# Patient Record
Sex: FEMALE | Race: WHITE | NOT HISPANIC OR LATINO | URBAN - METROPOLITAN AREA
[De-identification: names, ages, dates, MRNs, and addresses within clinical notes are randomized per-mention and may not be internally consistent; named-entity substitution may affect disease eponyms.]

---

## 2022-09-09 ENCOUNTER — EMERGENCY (EMERGENCY)
Facility: HOSPITAL | Age: 82
LOS: 1 days | Discharge: ANOTHER HOSPITAL | End: 2022-09-09
Attending: EMERGENCY MEDICINE
Payer: MEDICARE

## 2022-09-09 ENCOUNTER — INPATIENT (INPATIENT)
Facility: HOSPITAL | Age: 82
LOS: 5 days | Discharge: NURSING, REHAB OR LONG-TERM CARE | DRG: 467 | End: 2022-09-15
Attending: ORTHOPAEDIC SURGERY | Admitting: ORTHOPAEDIC SURGERY
Payer: MEDICARE

## 2022-09-09 VITALS
RESPIRATION RATE: 18 BRPM | HEART RATE: 83 BPM | TEMPERATURE: 98 F | SYSTOLIC BLOOD PRESSURE: 144 MMHG | OXYGEN SATURATION: 99 % | DIASTOLIC BLOOD PRESSURE: 73 MMHG

## 2022-09-09 VITALS
HEART RATE: 76 BPM | RESPIRATION RATE: 16 BRPM | WEIGHT: 139.99 LBS | HEIGHT: 60 IN | SYSTOLIC BLOOD PRESSURE: 175 MMHG | TEMPERATURE: 98 F | OXYGEN SATURATION: 99 % | DIASTOLIC BLOOD PRESSURE: 78 MMHG

## 2022-09-09 VITALS
HEART RATE: 114 BPM | HEIGHT: 60 IN | SYSTOLIC BLOOD PRESSURE: 106 MMHG | OXYGEN SATURATION: 96 % | RESPIRATION RATE: 20 BRPM | WEIGHT: 136.03 LBS | DIASTOLIC BLOOD PRESSURE: 76 MMHG | TEMPERATURE: 98 F

## 2022-09-09 DIAGNOSIS — M97.8XXA PERIPROSTHETIC FRACTURE AROUND OTHER INTERNAL PROSTHETIC JOINT, INITIAL ENCOUNTER: ICD-10-CM

## 2022-09-09 LAB
ALBUMIN SERPL ELPH-MCNC: 3.5 G/DL — SIGNIFICANT CHANGE UP (ref 3.5–5)
ALBUMIN SERPL ELPH-MCNC: 3.9 G/DL — SIGNIFICANT CHANGE UP (ref 3.3–5)
ALP SERPL-CCNC: 55 U/L — SIGNIFICANT CHANGE UP (ref 40–120)
ALP SERPL-CCNC: 57 U/L — SIGNIFICANT CHANGE UP (ref 40–120)
ALT FLD-CCNC: 14 U/L DA — SIGNIFICANT CHANGE UP (ref 10–60)
ALT FLD-CCNC: 8 U/L — LOW (ref 10–45)
ANION GAP SERPL CALC-SCNC: 12 MMOL/L — SIGNIFICANT CHANGE UP (ref 5–17)
ANION GAP SERPL CALC-SCNC: 8 MMOL/L — SIGNIFICANT CHANGE UP (ref 5–17)
APTT BLD: 24.5 SEC — LOW (ref 27.5–35.5)
APTT BLD: 25.5 SEC — LOW (ref 27.5–35.5)
AST SERPL-CCNC: 17 U/L — SIGNIFICANT CHANGE UP (ref 10–40)
AST SERPL-CCNC: 24 U/L — SIGNIFICANT CHANGE UP (ref 10–40)
BASE EXCESS BLDV CALC-SCNC: -0.9 MMOL/L — SIGNIFICANT CHANGE UP (ref -2–3)
BASOPHILS # BLD AUTO: 0.01 K/UL — SIGNIFICANT CHANGE UP (ref 0–0.2)
BASOPHILS # BLD AUTO: 0.02 K/UL — SIGNIFICANT CHANGE UP (ref 0–0.2)
BASOPHILS NFR BLD AUTO: 0.1 % — SIGNIFICANT CHANGE UP (ref 0–2)
BASOPHILS NFR BLD AUTO: 0.3 % — SIGNIFICANT CHANGE UP (ref 0–2)
BILIRUB SERPL-MCNC: 0.4 MG/DL — SIGNIFICANT CHANGE UP (ref 0.2–1.2)
BILIRUB SERPL-MCNC: 0.5 MG/DL — SIGNIFICANT CHANGE UP (ref 0.2–1.2)
BLD GP AB SCN SERPL QL: NEGATIVE — SIGNIFICANT CHANGE UP
BUN SERPL-MCNC: 16 MG/DL — SIGNIFICANT CHANGE UP (ref 7–18)
BUN SERPL-MCNC: 23 MG/DL — SIGNIFICANT CHANGE UP (ref 7–23)
CA-I SERPL-SCNC: 1.22 MMOL/L — SIGNIFICANT CHANGE UP (ref 1.15–1.33)
CALCIUM SERPL-MCNC: 9.5 MG/DL — SIGNIFICANT CHANGE UP (ref 8.4–10.5)
CALCIUM SERPL-MCNC: 9.6 MG/DL — SIGNIFICANT CHANGE UP (ref 8.4–10.5)
CHLORIDE BLDV-SCNC: 104 MMOL/L — SIGNIFICANT CHANGE UP (ref 96–108)
CHLORIDE SERPL-SCNC: 103 MMOL/L — SIGNIFICANT CHANGE UP (ref 96–108)
CHLORIDE SERPL-SCNC: 105 MMOL/L — SIGNIFICANT CHANGE UP (ref 96–108)
CO2 BLDV-SCNC: 27 MMOL/L — HIGH (ref 22–26)
CO2 SERPL-SCNC: 23 MMOL/L — SIGNIFICANT CHANGE UP (ref 22–31)
CO2 SERPL-SCNC: 28 MMOL/L — SIGNIFICANT CHANGE UP (ref 22–31)
CREAT SERPL-MCNC: 0.8 MG/DL — SIGNIFICANT CHANGE UP (ref 0.5–1.3)
CREAT SERPL-MCNC: 1 MG/DL — SIGNIFICANT CHANGE UP (ref 0.5–1.3)
EGFR: 57 ML/MIN/1.73M2 — LOW
EGFR: 75 ML/MIN/1.73M2 — SIGNIFICANT CHANGE UP
EOSINOPHIL # BLD AUTO: 0 K/UL — SIGNIFICANT CHANGE UP (ref 0–0.5)
EOSINOPHIL # BLD AUTO: 0.08 K/UL — SIGNIFICANT CHANGE UP (ref 0–0.5)
EOSINOPHIL NFR BLD AUTO: 0 % — SIGNIFICANT CHANGE UP (ref 0–6)
EOSINOPHIL NFR BLD AUTO: 1.1 % — SIGNIFICANT CHANGE UP (ref 0–6)
GAS PNL BLDV: 137 MMOL/L — SIGNIFICANT CHANGE UP (ref 136–145)
GAS PNL BLDV: SIGNIFICANT CHANGE UP
GLUCOSE BLDV-MCNC: 207 MG/DL — HIGH (ref 70–99)
GLUCOSE SERPL-MCNC: 118 MG/DL — HIGH (ref 70–99)
GLUCOSE SERPL-MCNC: 205 MG/DL — HIGH (ref 70–99)
HCO3 BLDV-SCNC: 25 MMOL/L — SIGNIFICANT CHANGE UP (ref 22–29)
HCT VFR BLD CALC: 32 % — LOW (ref 34.5–45)
HCT VFR BLD CALC: 37.4 % — SIGNIFICANT CHANGE UP (ref 34.5–45)
HCT VFR BLDA CALC: 31 % — LOW (ref 34.5–46.5)
HGB BLD CALC-MCNC: 10.2 G/DL — LOW (ref 11.7–16.1)
HGB BLD-MCNC: 10.2 G/DL — LOW (ref 11.5–15.5)
HGB BLD-MCNC: 11.9 G/DL — SIGNIFICANT CHANGE UP (ref 11.5–15.5)
IMM GRANULOCYTES NFR BLD AUTO: 0.7 % — SIGNIFICANT CHANGE UP (ref 0–1.5)
IMM GRANULOCYTES NFR BLD AUTO: 0.8 % — SIGNIFICANT CHANGE UP (ref 0–1.5)
INR BLD: 1 RATIO — SIGNIFICANT CHANGE UP (ref 0.88–1.16)
INR BLD: 1.08 RATIO — SIGNIFICANT CHANGE UP (ref 0.88–1.16)
LACTATE BLDV-MCNC: 2.2 MMOL/L — HIGH (ref 0.5–2)
LYMPHOCYTES # BLD AUTO: 0.54 K/UL — LOW (ref 1–3.3)
LYMPHOCYTES # BLD AUTO: 1.54 K/UL — SIGNIFICANT CHANGE UP (ref 1–3.3)
LYMPHOCYTES # BLD AUTO: 20.4 % — SIGNIFICANT CHANGE UP (ref 13–44)
LYMPHOCYTES # BLD AUTO: 4.5 % — LOW (ref 13–44)
MCHC RBC-ENTMCNC: 30.2 PG — SIGNIFICANT CHANGE UP (ref 27–34)
MCHC RBC-ENTMCNC: 30.2 PG — SIGNIFICANT CHANGE UP (ref 27–34)
MCHC RBC-ENTMCNC: 31.8 GM/DL — LOW (ref 32–36)
MCHC RBC-ENTMCNC: 31.9 GM/DL — LOW (ref 32–36)
MCV RBC AUTO: 94.7 FL — SIGNIFICANT CHANGE UP (ref 80–100)
MCV RBC AUTO: 94.9 FL — SIGNIFICANT CHANGE UP (ref 80–100)
MONOCYTES # BLD AUTO: 0.54 K/UL — SIGNIFICANT CHANGE UP (ref 0–0.9)
MONOCYTES # BLD AUTO: 0.61 K/UL — SIGNIFICANT CHANGE UP (ref 0–0.9)
MONOCYTES NFR BLD AUTO: 5.1 % — SIGNIFICANT CHANGE UP (ref 2–14)
MONOCYTES NFR BLD AUTO: 7.2 % — SIGNIFICANT CHANGE UP (ref 2–14)
NEUTROPHILS # BLD AUTO: 10.72 K/UL — HIGH (ref 1.8–7.4)
NEUTROPHILS # BLD AUTO: 5.32 K/UL — SIGNIFICANT CHANGE UP (ref 1.8–7.4)
NEUTROPHILS NFR BLD AUTO: 70.3 % — SIGNIFICANT CHANGE UP (ref 43–77)
NEUTROPHILS NFR BLD AUTO: 89.5 % — HIGH (ref 43–77)
NRBC # BLD: 0 /100 WBCS — SIGNIFICANT CHANGE UP (ref 0–0)
NRBC # BLD: 0 /100 WBCS — SIGNIFICANT CHANGE UP (ref 0–0)
NT-PROBNP SERPL-SCNC: 795 PG/ML — HIGH (ref 0–300)
PCO2 BLDV: 48 MMHG — HIGH (ref 39–42)
PH BLDV: 7.33 — SIGNIFICANT CHANGE UP (ref 7.32–7.43)
PLATELET # BLD AUTO: 241 K/UL — SIGNIFICANT CHANGE UP (ref 150–400)
PLATELET # BLD AUTO: 250 K/UL — SIGNIFICANT CHANGE UP (ref 150–400)
PO2 BLDV: 40 MMHG — SIGNIFICANT CHANGE UP (ref 25–45)
POTASSIUM BLDV-SCNC: 4.6 MMOL/L — SIGNIFICANT CHANGE UP (ref 3.5–5.1)
POTASSIUM SERPL-MCNC: 4 MMOL/L — SIGNIFICANT CHANGE UP (ref 3.5–5.3)
POTASSIUM SERPL-MCNC: 4.5 MMOL/L — SIGNIFICANT CHANGE UP (ref 3.5–5.3)
POTASSIUM SERPL-SCNC: 4 MMOL/L — SIGNIFICANT CHANGE UP (ref 3.5–5.3)
POTASSIUM SERPL-SCNC: 4.5 MMOL/L — SIGNIFICANT CHANGE UP (ref 3.5–5.3)
PROT SERPL-MCNC: 7.2 G/DL — SIGNIFICANT CHANGE UP (ref 6–8.3)
PROT SERPL-MCNC: 7.7 G/DL — SIGNIFICANT CHANGE UP (ref 6–8.3)
PROTHROM AB SERPL-ACNC: 11.9 SEC — SIGNIFICANT CHANGE UP (ref 10.5–13.4)
PROTHROM AB SERPL-ACNC: 12.5 SEC — SIGNIFICANT CHANGE UP (ref 10.5–13.4)
RBC # BLD: 3.38 M/UL — LOW (ref 3.8–5.2)
RBC # BLD: 3.94 M/UL — SIGNIFICANT CHANGE UP (ref 3.8–5.2)
RBC # FLD: 11.8 % — SIGNIFICANT CHANGE UP (ref 10.3–14.5)
RBC # FLD: 11.9 % — SIGNIFICANT CHANGE UP (ref 10.3–14.5)
RH IG SCN BLD-IMP: POSITIVE — SIGNIFICANT CHANGE UP
SAO2 % BLDV: 70 % — SIGNIFICANT CHANGE UP (ref 67–88)
SARS-COV-2 RNA SPEC QL NAA+PROBE: SIGNIFICANT CHANGE UP
SARS-COV-2 RNA SPEC QL NAA+PROBE: SIGNIFICANT CHANGE UP
SODIUM SERPL-SCNC: 138 MMOL/L — SIGNIFICANT CHANGE UP (ref 135–145)
SODIUM SERPL-SCNC: 141 MMOL/L — SIGNIFICANT CHANGE UP (ref 135–145)
TROPONIN T, HIGH SENSITIVITY RESULT: 31 NG/L — SIGNIFICANT CHANGE UP (ref 0–51)
WBC # BLD: 11.97 K/UL — HIGH (ref 3.8–10.5)
WBC # BLD: 7.55 K/UL — SIGNIFICANT CHANGE UP (ref 3.8–10.5)
WBC # FLD AUTO: 11.97 K/UL — HIGH (ref 3.8–10.5)
WBC # FLD AUTO: 7.55 K/UL — SIGNIFICANT CHANGE UP (ref 3.8–10.5)

## 2022-09-09 PROCEDURE — 73552 X-RAY EXAM OF FEMUR 2/>: CPT | Mod: 26,RT

## 2022-09-09 PROCEDURE — 73551 X-RAY EXAM OF FEMUR 1: CPT | Mod: 26,59,RT

## 2022-09-09 PROCEDURE — 96374 THER/PROPH/DIAG INJ IV PUSH: CPT

## 2022-09-09 PROCEDURE — 99291 CRITICAL CARE FIRST HOUR: CPT | Mod: 25

## 2022-09-09 PROCEDURE — 73502 X-RAY EXAM HIP UNI 2-3 VIEWS: CPT

## 2022-09-09 PROCEDURE — 73502 X-RAY EXAM HIP UNI 2-3 VIEWS: CPT | Mod: 26,RT

## 2022-09-09 PROCEDURE — 73552 X-RAY EXAM OF FEMUR 2/>: CPT

## 2022-09-09 PROCEDURE — 85730 THROMBOPLASTIN TIME PARTIAL: CPT

## 2022-09-09 PROCEDURE — 86850 RBC ANTIBODY SCREEN: CPT

## 2022-09-09 PROCEDURE — 86901 BLOOD TYPING SEROLOGIC RH(D): CPT

## 2022-09-09 PROCEDURE — 71045 X-RAY EXAM CHEST 1 VIEW: CPT

## 2022-09-09 PROCEDURE — 85610 PROTHROMBIN TIME: CPT

## 2022-09-09 PROCEDURE — 71045 X-RAY EXAM CHEST 1 VIEW: CPT | Mod: 26

## 2022-09-09 PROCEDURE — 96375 TX/PRO/DX INJ NEW DRUG ADDON: CPT

## 2022-09-09 PROCEDURE — 93005 ELECTROCARDIOGRAM TRACING: CPT

## 2022-09-09 PROCEDURE — 93010 ELECTROCARDIOGRAM REPORT: CPT

## 2022-09-09 PROCEDURE — 85025 COMPLETE CBC W/AUTO DIFF WBC: CPT

## 2022-09-09 PROCEDURE — 87635 SARS-COV-2 COVID-19 AMP PRB: CPT

## 2022-09-09 PROCEDURE — 80053 COMPREHEN METABOLIC PANEL: CPT

## 2022-09-09 PROCEDURE — 99291 CRITICAL CARE FIRST HOUR: CPT

## 2022-09-09 PROCEDURE — 86900 BLOOD TYPING SEROLOGIC ABO: CPT

## 2022-09-09 PROCEDURE — 99285 EMERGENCY DEPT VISIT HI MDM: CPT | Mod: 25

## 2022-09-09 PROCEDURE — 73502 X-RAY EXAM HIP UNI 2-3 VIEWS: CPT | Mod: 26,RT,77

## 2022-09-09 PROCEDURE — 36415 COLL VENOUS BLD VENIPUNCTURE: CPT

## 2022-09-09 PROCEDURE — 71045 X-RAY EXAM CHEST 1 VIEW: CPT | Mod: 26,77

## 2022-09-09 RX ORDER — ACETAMINOPHEN 500 MG
650 TABLET ORAL EVERY 6 HOURS
Refills: 0 | Status: DISCONTINUED | OUTPATIENT
Start: 2022-09-09 | End: 2022-09-11

## 2022-09-09 RX ORDER — MAGNESIUM HYDROXIDE 400 MG/1
30 TABLET, CHEWABLE ORAL DAILY
Refills: 0 | Status: DISCONTINUED | OUTPATIENT
Start: 2022-09-09 | End: 2022-09-15

## 2022-09-09 RX ORDER — OXYCODONE HYDROCHLORIDE 5 MG/1
2.5 TABLET ORAL EVERY 4 HOURS
Refills: 0 | Status: DISCONTINUED | OUTPATIENT
Start: 2022-09-09 | End: 2022-09-15

## 2022-09-09 RX ORDER — SENNA PLUS 8.6 MG/1
2 TABLET ORAL AT BEDTIME
Refills: 0 | Status: DISCONTINUED | OUTPATIENT
Start: 2022-09-09 | End: 2022-09-15

## 2022-09-09 RX ORDER — ACETAMINOPHEN 500 MG
1000 TABLET ORAL ONCE
Refills: 0 | Status: COMPLETED | OUTPATIENT
Start: 2022-09-09 | End: 2022-09-09

## 2022-09-09 RX ORDER — ENOXAPARIN SODIUM 100 MG/ML
40 INJECTION SUBCUTANEOUS EVERY 24 HOURS
Refills: 0 | Status: COMPLETED | OUTPATIENT
Start: 2022-09-09 | End: 2022-09-10

## 2022-09-09 RX ORDER — HYDROMORPHONE HYDROCHLORIDE 2 MG/ML
1 INJECTION INTRAMUSCULAR; INTRAVENOUS; SUBCUTANEOUS ONCE
Refills: 0 | Status: DISCONTINUED | OUTPATIENT
Start: 2022-09-09 | End: 2022-09-09

## 2022-09-09 RX ORDER — OXYCODONE HYDROCHLORIDE 5 MG/1
5 TABLET ORAL EVERY 4 HOURS
Refills: 0 | Status: DISCONTINUED | OUTPATIENT
Start: 2022-09-09 | End: 2022-09-15

## 2022-09-09 RX ORDER — MORPHINE SULFATE 50 MG/1
4 CAPSULE, EXTENDED RELEASE ORAL ONCE
Refills: 0 | Status: DISCONTINUED | OUTPATIENT
Start: 2022-09-09 | End: 2022-09-09

## 2022-09-09 RX ORDER — LANOLIN ALCOHOL/MO/W.PET/CERES
3 CREAM (GRAM) TOPICAL AT BEDTIME
Refills: 0 | Status: DISCONTINUED | OUTPATIENT
Start: 2022-09-09 | End: 2022-09-15

## 2022-09-09 RX ORDER — LEVOTHYROXINE SODIUM 125 MCG
75 TABLET ORAL DAILY
Refills: 0 | Status: DISCONTINUED | OUTPATIENT
Start: 2022-09-09 | End: 2022-09-15

## 2022-09-09 RX ORDER — LEVOTHYROXINE SODIUM 125 MCG
1 TABLET ORAL
Qty: 0 | Refills: 0 | DISCHARGE

## 2022-09-09 RX ADMIN — MORPHINE SULFATE 4 MILLIGRAM(S): 50 CAPSULE, EXTENDED RELEASE ORAL at 10:36

## 2022-09-09 RX ADMIN — Medication 400 MILLIGRAM(S): at 23:49

## 2022-09-09 RX ADMIN — MORPHINE SULFATE 4 MILLIGRAM(S): 50 CAPSULE, EXTENDED RELEASE ORAL at 11:06

## 2022-09-09 RX ADMIN — HYDROMORPHONE HYDROCHLORIDE 1 MILLIGRAM(S): 2 INJECTION INTRAMUSCULAR; INTRAVENOUS; SUBCUTANEOUS at 12:38

## 2022-09-09 NOTE — ED PROVIDER NOTE - OBJECTIVE STATEMENT
81-year-old female coming in with a fall. While opening the trunk of her car patient fell backwards.  She is complaining of right hip and femur pain.  She has had multiple hip fractures and replacement? in the past and is unsure the nature of the hardware that she currently has.  Denies dizziness chest pain hitting head or any other injuries.

## 2022-09-09 NOTE — ED PROVIDER NOTE - OBJECTIVE STATEMENT
Attending note.  Patient was seen in room #22 right.  Pain she was transferred from [place] [facility].  Patient was getting something out of her truck today and fell backwards.  Patient was taken to the emergency department there and diagnosed with a periprosthetic right hip fracture.  Patient is complaining of severe pain.  She denies any other injury.  She denies any headache, dizziness, nausea or vomiting.  She denies any new neck or back pain.  She has chronic pain in the lower back from spinal stenosis.  She denies any chest/rib pain.  She denies any abdominal pain.  She denies any other extremity pain or injury.  She has no numbness or paresthesia.  EMS reports that patient was tachycardic on departure from [facility] and was given morphine for pain.  Patient only reports history of hypothyroidism.  She denies any CAD, CVA, COPD, diabetes, hypertension, hyperlipidemia.

## 2022-09-09 NOTE — ED PROVIDER NOTE - PHYSICAL EXAMINATION
Attn - alert, mod pain, head - NC/AT, no facial tenderness, mandible and TMJ are NT, tongue - no trauma, PERRL 3 mm, nose - NT, no deformity or signs of epistaxis, neck/spine/back - NT, Chest/ribs - NT, Lungs - clear, good BS bilaterally without splinting, Cor - RR, no M, Abdo - soft, NT, ND, no HSM or tenderness, no ecchymosis or signs of trauma, no CVAT, Pelvis/hips - tender R thigh/hip, and not able to move due to pain - NT intact.  UExt - FROM without pain, NT, LExt - left LLext - FROM without pain, NT,  Neuro - CN, motor, sensory, cerebellar, speech intact and non focal.

## 2022-09-09 NOTE — ED PROVIDER NOTE - CLINICAL SUMMARY MEDICAL DECISION MAKING FREE TEXT BOX
patient with periprostetic femur fx. orthopedics consulted. recommends transfer to [facility] for orthopedics trauma repair. morphine and dilaudid given for pain

## 2022-09-09 NOTE — ED PROVIDER NOTE - PHYSICAL EXAMINATION
right leg with external rotation, shortening tenderness to palpation and swelling to right hip and proximal femur area. full range of motion to foot, food capillary refil. awake alert, no other injuries. awake alert

## 2022-09-09 NOTE — ED ADULT NURSE REASSESSMENT NOTE - NS ED NURSE REASSESS COMMENT FT1
Prior to transfer on transfer team arrival HR noted to be elevated 130-140 sinus tachy. Dr gatica aware, Ekg done. Pt transferred as ordered with EMS in no distress. Report given to Jazmin Jackson.

## 2022-09-09 NOTE — ED ADULT NURSE NOTE - OBJECTIVE STATEMENT
Received pt in assigned room a&xo3, transferred from [place] for ortho consult r/t confirmed rt femur fracture, pt s/p mechanical fall today landing on her right side and back, denies hitting head/loc/taking anticoagulants, pt has IVL intact & medicated prior to arrival, Gutierrez cath also noted upon arrival, with 300 cc of yellow urine,  resps even and non labored, pt is tachy, denies any cp or palps,   RLE is outwardly rotated & shortened, pt has good color, pulse & sensation to RLE, currently denies any pain, no other complaints,  MD at bedside will continue to monitor pt.

## 2022-09-09 NOTE — ED PROVIDER NOTE - CLINICAL SUMMARY MEDICAL DECISION MAKING FREE TEXT BOX
Attending note.  Periprosthetic hip fracture on the right.  Analgesia, preop labs, full complement of x-rays, EKG, chest x-ray, COVID.

## 2022-09-09 NOTE — H&P ADULT - HISTORY OF PRESENT ILLNESS
Orthopedics    80 y/o F, from home, ambulates with a cane at baseline, reports no significant PMHx and a PSHx of b/l QUE (10 years ago at [facility]") & Left TKA (10 years ago at [facility]") who presents today c/o right hip pain s/p mechanical fall today. Patient states she was getting something out of the trunk of her car and fell backwards onto her right hip, denies any head trauma, LOC, or syncope. After the fall patient was unable to ambulate or stand and was brought in by EMS. Patient states the pain is located over right hip with radiation to groin and knee, exacerbated with movement of the right leg. Pt denies Chest pain, SOB, dyspnea, paresthesias, N/V/D, abdominal pain, syncope, or pain anywhere else.     Hypothyroidism    Spinal stenosis            No Known Allergies      PHYSICAL EXAM:  T(C): 36.7 (09-09-22 @ 19:28), Max: 36.8 (09-09-22 @ 17:21)  HR: 114 (09-09-22 @ 19:28) (114 - 128)  BP: 108/69 (09-09-22 @ 19:28) (106/76 - 121/67)  RR: 18 (09-09-22 @ 19:28) (18 - 20)  SpO2: 97% (09-09-22 @ 19:28) (96% - 98%)    Gen: NAD, Resting comfortably    RLE:  Skin intact, leg shortened and externally rotated  + tenderness to palpation proximal thigh   +EHL/FHL/TA/GSC  +SILT L3-S1  + DP  Compartments soft and compressible  No calf tenderness    Secondary Survey:   No TTP over bony prominences, SILT, palpable pulses, full/painless A/PROM, compartments soft. No TTP over spinous processes or paraspinal muscles at C/T/L spine. No palpable step off. No other injuries or complaints.        A/P: 81F w/ R Vanc B2 PP Hip Fx    Plan:    -Plan for surgical intervention 9/10 am, will book and begin preop.  -Preop labs/imaging: CBC/BMP/PT/PTT/INR/T&S/Covid/CXR/EKG.  -NPO after midnight 9/10/IVFs while NPO.  -Bedrest  -DVT ppx until midnight 9/10  -Pain control prn  -Medical management, continue home meds.  -Please document medical clearance  -Case discussed with attending, will advise if plan changes.

## 2022-09-09 NOTE — ED ADULT NURSE REASSESSMENT NOTE - NS ED NURSE REASSESS COMMENT FT1
resting in bed; appears comfortable; alert and oriented; skin warm and dry; Gutierrez cath intact and drain clear yellow urine in sufficient amounts; pt instructed to remain npo until further notice.

## 2022-09-09 NOTE — ED PROVIDER NOTE - PROGRESS NOTE DETAILS
transfer team arrived, when patient transferred to ambulance stretcher vitals reveal tachycardia. EKG reveals sinus tachy. O2 sat normal, BP normal. Patient denies complaints. denies alcohol use.

## 2022-09-09 NOTE — ED ADULT NURSE NOTE - SKIN CAPILLARY REFILL
Implemented All Universal Safety Interventions:  [place] to call system. Call bell, personal items and telephone within reach. Instruct patient to call for assistance. Room bathroom lighting operational. Non-slip footwear when patient is off stretcher. Physically safe environment: no spills, clutter or unnecessary equipment. Stretcher in lowest position, wheels locked, appropriate side rails in place. 2 seconds or less

## 2022-09-09 NOTE — CONSULT NOTE ADULT - SUBJECTIVE AND OBJECTIVE BOX
JOSH DIETRICH  [id number]    Orthopedic Consult: Right QUE periprosthetic fracture     Orthopedic Diagnosis: Right QUE periprosthetic fracture       JOSH [id number]  HPI:  80 y/o F, from home, ambulates with a cane at baseline, reports no significant PMHx and a PSHx of b/l QUE (10 years ago at [facility]) & Left QUE (10 years ago at [facility]) who presents today c/o right hip pain s/p mechanical fall today. Patient states she was getting something out of the trunk of her car and fell backwards onto her right hip, denies any head trauma, LOC, or syncope. After the fall patient was unable to ambulate or stand and was brought in by EMS. Patient states the pain is located over right hip with radiation to groin and knee, exacerbated with movement of the right leg. Pt denies Chest pain, SOB, dyspnea, paresthesias, N/V/D, abdominal pain, syncope, or pain anywhere else.         Ambulation: Cane    PAST MEDICAL & SURGICAL HISTORY:  Bilateral QUE  Left TKA    FAMILY HISTORY:      Social History:      Allergies    No Known Allergies    Intolerances        Home Medications:      Vital Signs Last 24 Hrs  T(C): 36.7 (09 Sep 2022 11:37), Max: 36.7 (09 Sep 2022 09:24)  T(F): 98 (09 Sep 2022 11:37), Max: 98.1 (09 Sep 2022 09:24)  HR: 83 (09 Sep 2022 11:37) (76 - 83)  BP: 144/73 (09 Sep 2022 11:37) (144/73 - 175/78)  BP(mean): --  RR: 18 (09 Sep 2022 11:37) (16 - 18)  SpO2: 99% (09 Sep 2022 11:37) (99% - 99%)    Parameters below as of 09 Sep 2022 11:37  Patient On (Oxygen Delivery Method): room air      I&O's Summary      Physical Exam:  General: Alert and oriented, NAD, resting comfortably  Musculoskeletal:  Right hip: Scar noted to b/l hips. Skin warm and pink. No open wounds. Right leg shortened and externally rotated. Hip AROM limited 2/2 pain. Compartments soft and compressible. TTP noted to right hip. SILT. NVI. (+)EHL/FHL/ADF/APF intact. Palpable DP/PT pulses  Lower extremities: Calves soft and NTTP b/l. SILT. NVI. (+)EHL/FHL/ADF/APF intact bilaterally. Vertical scar noted to left knee.    Labs:                        11.9   7.55  )-----------( 241      ( 09 Sep 2022 10:30 )             37.4     09-09    141  |  105  |  16  ----------------------------<  118<H>  4.0   |  28  |  0.80    Ca    9.5      09 Sep 2022 10:30    TPro  7.7  /  Alb  3.5  /  TBili  0.5  /  DBili  x   /  AST  24  /  ALT  14  /  AlkPhos  57  09-09    PT/INR - ( 09 Sep 2022 10:30 )   PT: 11.9 sec;   INR: 1.00 ratio         PTT - ( 09 Sep 2022 10:30 )  PTT:25.5 sec    Radiology:      Impression: Patient is a 81yFemale with Right QUE periprosthetic fracture   Plan:  - Recommendation: [ ] Conservative management [ XX ] Surgical intervention  - Pain management  - Fracture care, bedrest, NWB to RLE  - Due to the complexity and nature of the fracture transfer to trauma hospital is recommended by Dr. Wilkinson for revision right total hip replacement   - Discussed treatment and transfer with patient who is in agreement and would like to be transferred to [facility] for surgical intervention  - Case d/w Dr. Wilkinson    JOSH DIETRICH  [id number]    Orthopedic Consult: Right QUE periprosthetic fracture     JOSH [id number]  HPI:  80 y/o F, from home, ambulates with a cane at baseline, reports no significant PMHx and a PSHx of b/l QUE (10 years ago at [facility]) & Left QUE (10 years ago at [facility]) who presents today c/o right hip pain s/p mechanical fall today. Patient states she was getting something out of the trunk of her car and fell backwards onto her right hip, denies any head trauma, LOC, or syncope. After the fall patient was unable to ambulate or stand and was brought in by EMS. Patient states the pain is located over right hip with radiation to groin and knee, exacerbated with movement of the right leg. Pt denies Chest pain, SOB, dyspnea, paresthesias, N/V/D, abdominal pain, syncope, or pain anywhere else.     Ambulation: Cane    PAST MEDICAL & SURGICAL HISTORY:  Bilateral QUE  Left TKA    FAMILY HISTORY:    Social History:    Allergies    No Known Allergies    Intolerances    Home Medications:    Vital Signs Last 24 Hrs  T(C): 36.7 (09 Sep 2022 11:37), Max: 36.7 (09 Sep 2022 09:24)  T(F): 98 (09 Sep 2022 11:37), Max: 98.1 (09 Sep 2022 09:24)  HR: 83 (09 Sep 2022 11:37) (76 - 83)  BP: 144/73 (09 Sep 2022 11:37) (144/73 - 175/78)  BP(mean): --  RR: 18 (09 Sep 2022 11:37) (16 - 18)  SpO2: 99% (09 Sep 2022 11:37) (99% - 99%)    Parameters below as of 09 Sep 2022 11:37  Patient On (Oxygen Delivery Method): room air    I&O's Summary    Physical Exam:  General: Alert and oriented, NAD, resting comfortably  Musculoskeletal:  Right hip: Scar noted to b/l hips. Skin warm and pink. No open wounds. Right leg shortened and externally rotated. Hip AROM limited 2/2 pain. Compartments soft and compressible. TTP noted to right hip. SILT. NVI. (+)EHL/FHL/ADF/APF intact. Palpable DP/PT pulses  Lower extremities: Calves soft and NTTP b/l. SILT. NVI. (+)EHL/FHL/ADF/APF intact bilaterally. Vertical scar noted to left knee.    Labs:                        11.9   7.55  )-----------( 241      ( 09 Sep 2022 10:30 )             37.4     09-09    141  |  105  |  16  ----------------------------<  118<H>  4.0   |  28  |  0.80    Ca    9.5      09 Sep 2022 10:30    TPro  7.7  /  Alb  3.5  /  TBili  0.5  /  DBili  x   /  AST  24  /  ALT  14  /  AlkPhos  57  09-09    PT/INR - ( 09 Sep 2022 10:30 )   PT: 11.9 sec;   INR: 1.00 ratio      PTT - ( 09 Sep 2022 10:30 )  PTT:25.5 sec    Radiology:      Impression: Patient is a 81yFemale with Right QUE periprosthetic fracture     Plan:  - Recommendation: [ ] Conservative management [ XX ] Surgical intervention  - Pain management  - Fracture care, bedrest, NWB to RLE  - Due to the complexity and nature of the fracture transfer to trauma hospital is recommended by Dr. Wilkinson for revision right total hip replacement   - Discussed treatment and transfer with patient who is in agreement and would like to be transferred to [facility] for surgical intervention  - Case d/w Dr. Wilkinson

## 2022-09-10 LAB
ANION GAP SERPL CALC-SCNC: 12 MMOL/L — SIGNIFICANT CHANGE UP (ref 5–17)
APPEARANCE UR: CLEAR — SIGNIFICANT CHANGE UP
APTT BLD: 22.9 SEC — LOW (ref 27.5–35.5)
BACTERIA # UR AUTO: NEGATIVE — SIGNIFICANT CHANGE UP
BILIRUB UR-MCNC: NEGATIVE — SIGNIFICANT CHANGE UP
BUN SERPL-MCNC: 36 MG/DL — HIGH (ref 7–23)
CALCIUM SERPL-MCNC: 9 MG/DL — SIGNIFICANT CHANGE UP (ref 8.4–10.5)
CHLORIDE SERPL-SCNC: 101 MMOL/L — SIGNIFICANT CHANGE UP (ref 96–108)
CO2 SERPL-SCNC: 24 MMOL/L — SIGNIFICANT CHANGE UP (ref 22–31)
COLOR SPEC: YELLOW — SIGNIFICANT CHANGE UP
CREAT SERPL-MCNC: 1.7 MG/DL — HIGH (ref 0.5–1.3)
DIFF PNL FLD: ABNORMAL
EGFR: 30 ML/MIN/1.73M2 — LOW
EPI CELLS # UR: 3 /HPF — SIGNIFICANT CHANGE UP
GLUCOSE SERPL-MCNC: 132 MG/DL — HIGH (ref 70–99)
GLUCOSE UR QL: NEGATIVE — SIGNIFICANT CHANGE UP
HCT VFR BLD CALC: 27.7 % — LOW (ref 34.5–45)
HGB BLD-MCNC: 8.7 G/DL — LOW (ref 11.5–15.5)
HYALINE CASTS # UR AUTO: 4 /LPF — SIGNIFICANT CHANGE UP (ref 0–7)
INR BLD: 0.99 RATIO — SIGNIFICANT CHANGE UP (ref 0.88–1.16)
KETONES UR-MCNC: NEGATIVE — SIGNIFICANT CHANGE UP
LEUKOCYTE ESTERASE UR-ACNC: ABNORMAL
MCHC RBC-ENTMCNC: 29.9 PG — SIGNIFICANT CHANGE UP (ref 27–34)
MCHC RBC-ENTMCNC: 31.4 GM/DL — LOW (ref 32–36)
MCV RBC AUTO: 95.2 FL — SIGNIFICANT CHANGE UP (ref 80–100)
NITRITE UR-MCNC: NEGATIVE — SIGNIFICANT CHANGE UP
NRBC # BLD: 0 /100 WBCS — SIGNIFICANT CHANGE UP (ref 0–0)
PH UR: 5.5 — SIGNIFICANT CHANGE UP (ref 5–8)
PLATELET # BLD AUTO: 205 K/UL — SIGNIFICANT CHANGE UP (ref 150–400)
POTASSIUM SERPL-MCNC: 5.1 MMOL/L — SIGNIFICANT CHANGE UP (ref 3.5–5.3)
POTASSIUM SERPL-SCNC: 5.1 MMOL/L — SIGNIFICANT CHANGE UP (ref 3.5–5.3)
PROT UR-MCNC: ABNORMAL
PROTHROM AB SERPL-ACNC: 11.5 SEC — SIGNIFICANT CHANGE UP (ref 10.5–13.4)
RBC # BLD: 2.91 M/UL — LOW (ref 3.8–5.2)
RBC # FLD: 11.9 % — SIGNIFICANT CHANGE UP (ref 10.3–14.5)
RBC CASTS # UR COMP ASSIST: 12 /HPF — HIGH (ref 0–4)
SODIUM SERPL-SCNC: 137 MMOL/L — SIGNIFICANT CHANGE UP (ref 135–145)
SP GR SPEC: 1.03 — HIGH (ref 1.01–1.02)
UROBILINOGEN FLD QL: NEGATIVE — SIGNIFICANT CHANGE UP
WBC # BLD: 8.1 K/UL — SIGNIFICANT CHANGE UP (ref 3.8–10.5)
WBC # FLD AUTO: 8.1 K/UL — SIGNIFICANT CHANGE UP (ref 3.8–10.5)
WBC UR QL: 11 /HPF — HIGH (ref 0–5)

## 2022-09-10 RX ORDER — SODIUM CHLORIDE 9 MG/ML
1000 INJECTION, SOLUTION INTRAVENOUS
Refills: 0 | Status: DISCONTINUED | OUTPATIENT
Start: 2022-09-10 | End: 2022-09-11

## 2022-09-10 RX ADMIN — OXYCODONE HYDROCHLORIDE 5 MILLIGRAM(S): 5 TABLET ORAL at 01:20

## 2022-09-10 RX ADMIN — Medication 1000 MILLIGRAM(S): at 00:14

## 2022-09-10 RX ADMIN — SODIUM CHLORIDE 75 MILLILITER(S): 9 INJECTION, SOLUTION INTRAVENOUS at 09:19

## 2022-09-10 RX ADMIN — Medication 650 MILLIGRAM(S): at 17:57

## 2022-09-10 RX ADMIN — Medication 650 MILLIGRAM(S): at 13:30

## 2022-09-10 RX ADMIN — OXYCODONE HYDROCHLORIDE 5 MILLIGRAM(S): 5 TABLET ORAL at 00:14

## 2022-09-10 RX ADMIN — Medication 650 MILLIGRAM(S): at 18:25

## 2022-09-10 RX ADMIN — Medication 650 MILLIGRAM(S): at 05:34

## 2022-09-10 RX ADMIN — Medication 650 MILLIGRAM(S): at 12:54

## 2022-09-10 RX ADMIN — Medication 650 MILLIGRAM(S): at 23:45

## 2022-09-10 RX ADMIN — ENOXAPARIN SODIUM 40 MILLIGRAM(S): 100 INJECTION SUBCUTANEOUS at 12:54

## 2022-09-10 RX ADMIN — Medication 75 MICROGRAM(S): at 05:35

## 2022-09-10 RX ADMIN — Medication 3 MILLIGRAM(S): at 23:45

## 2022-09-10 NOTE — PROGRESS NOTE ADULT - SUBJECTIVE AND OBJECTIVE BOX
Orthopedics    Pt seen and examined at the bedside. Pain is well controlled at this time, no concerns at this time.     Vital Signs Last 24 Hrs  T(C): 37.3 (09-10-22 @ 04:27), Max: 37.3 (09-10-22 @ 04:27)  T(F): 99.2 (09-10-22 @ 04:27), Max: 99.2 (09-10-22 @ 04:27)  HR: 92 (09-10-22 @ 04:27) (76 - 128)  BP: 91/60 (09-10-22 @ 04:27) (91/60 - 175/78)  BP(mean): --  RR: 18 (09-10-22 @ 04:27) (16 - 20)  SpO2: 97% (09-10-22 @ 04:27) (96% - 99%)                        8.7    8.10  )-----------( 205      ( 10 Sep 2022 06:38 )             27.7     10 Sep 2022 06:38    137    |  101    |  36     ----------------------------<  132    5.1     |  24     |  1.70     Ca    9.0        10 Sep 2022 06:38    TPro  7.2    /  Alb  3.9    /  TBili  0.4    /  DBili  x      /  AST  17     /  ALT  8      /  AlkPhos  55     09 Sep 2022 17:55    PT/INR - ( 10 Sep 2022 06:38 )   PT: 11.5 sec;   INR: 0.99 ratio         PTT - ( 10 Sep 2022 06:38 )  PTT:22.9 sec    Exam:  GEN: NAD, awake and alert.  RLE:  Skin intact, leg shortened and externally rotated  + tenderness to palpation proximal thigh   +EHL/FHL/TA/GSC  +SILT L3-S1  + DP  Compartments soft and compressible  No calf tenderness        A/P: 81F w/ R Vanc B2 PP Hip Fx    Plan:    -Plan for surgical intervention 9/11 am, will book and begin preop.  -Preop labs/imaging: CBC/BMP/PT/PTT/INR/T&S/Covid/CXR/EKG.  -NPO after midnight 9/10/IVFs while NPO.  -Bedrest  -DVT ppx until midnight 9/10  -Pain control prn  -Medical management, continue home meds.  -Please document medical clearance  -Case discussed with attending, will advise if plan changes.

## 2022-09-10 NOTE — CONSULT NOTE ADULT - SUBJECTIVE AND OBJECTIVE BOX
Medical attending pre op medical consultation     Patient is an elderly white female who sustained a mechanical fall and suffered a fractured hip requiring surgical repair.  She denies syncope seizure chest pain or sob  She is an independent ambulator at homee c/o being fatigued  With pain meds her paind is under control and sh e is not somnolent or lethargic.      Reason for Admission:    Reason for Admission:  · Reason for Admission	R Periprosthetic Hip Fx      · Subjective and Objective:   Orthopedics    Pt seen and examined at the bedside. Pain is well controlled at this time, no concerns at this time.     Vital Signs Last 24 Hrs  T(C): 37.3 (09-10-22 @ 04:27), Max: 37.3 (09-10-22 @ 04:27)  T(F): 99.2 (09-10-22 @ 04:27), Max: 99.2 (09-10-22 @ 04:27)  HR: 92 (09-10-22 @ 04:27) (76 - 128)  BP: 91/60 (09-10-22 @ 04:27) (91/60 - 175/78)  BP(mean): --  RR: 18 (09-10-22 @ 04:27) (16 - 20)  SpO2: 97% (09-10-22 @ 04:27) (96% - 99%)                        8.7    8.10  )-----------( 205      ( 10 Sep 2022 06:38 )             27.7     10 Sep 2022 06:38    137    |  101    |  36     ----------------------------<  132    5.1     |  24     |  1.70     Ca    9.0        10 Sep 2022 06:38    TPro  7.2    /  Alb  3.9    /  TBili  0.4    /  DBili  x      /  AST  17     /  ALT  8      /  AlkPhos  55     09 Sep 2022 17:55    PT/INR - ( 10 Sep 2022 06:38 )   PT: 11.5 sec;   INR: 0.99 ratio         PTT - ( 10 Sep 2022 06:38 )  PTT:22.9 sec    Exam:  GEN: NAD, awake and alert.  RLE:  Skin intact, leg shortened and externally rotated  + tenderness to palpation proximal thigh   +EHL/FHL/TA/GSC  +SILT L3-S1  + DP  Compartments soft and compressible  No calf tenderness        A/P: 81F w/ R Vanc B2 PP Hip Fx    Plan:    -Plan for surgical intervention 9/11 am, will book and begin preop.  -Preop labs/imaging: CBC/BMP/PT/PTT/INR/T&S/Covid/CXR/EKG.  -NPO after midnight 9/10/IVFs while NPO.  -Bedrest  -DVT ppx until midnight 9/10  -Pain control prn  -Medical management, continue home meds.  -Please document medical clearance  -Case discussed with attending, will advise if plan changes.

## 2022-09-10 NOTE — CONSULT NOTE ADULT - TIME BILLING
Patient was seen and examined physical exam   xrays and lab data reviewed.  The patient is medically stable to proceed with surgery as planned  Deep breathing exercises encouraged to prevent atelectasis    Time spent 60 minutes with review of physical exam and lab data.  She is stable to proceed to or in am    Kedar Ferreira MD

## 2022-09-11 DIAGNOSIS — M97.8XXA PERIPROSTHETIC FRACTURE AROUND OTHER INTERNAL PROSTHETIC JOINT, INITIAL ENCOUNTER: ICD-10-CM

## 2022-09-11 LAB
ANION GAP SERPL CALC-SCNC: 7 MMOL/L — SIGNIFICANT CHANGE UP (ref 5–17)
ANION GAP SERPL CALC-SCNC: 9 MMOL/L — SIGNIFICANT CHANGE UP (ref 5–17)
APTT BLD: 25.1 SEC — LOW (ref 27.5–35.5)
BUN SERPL-MCNC: 26 MG/DL — HIGH (ref 7–23)
BUN SERPL-MCNC: 40 MG/DL — HIGH (ref 7–23)
CALCIUM SERPL-MCNC: 7.9 MG/DL — LOW (ref 8.4–10.5)
CALCIUM SERPL-MCNC: 8.5 MG/DL — SIGNIFICANT CHANGE UP (ref 8.4–10.5)
CHLORIDE SERPL-SCNC: 101 MMOL/L — SIGNIFICANT CHANGE UP (ref 96–108)
CHLORIDE SERPL-SCNC: 103 MMOL/L — SIGNIFICANT CHANGE UP (ref 96–108)
CO2 SERPL-SCNC: 23 MMOL/L — SIGNIFICANT CHANGE UP (ref 22–31)
CO2 SERPL-SCNC: 26 MMOL/L — SIGNIFICANT CHANGE UP (ref 22–31)
CREAT SERPL-MCNC: 0.91 MG/DL — SIGNIFICANT CHANGE UP (ref 0.5–1.3)
CREAT SERPL-MCNC: 1.38 MG/DL — HIGH (ref 0.5–1.3)
EGFR: 39 ML/MIN/1.73M2 — LOW
EGFR: 64 ML/MIN/1.73M2 — SIGNIFICANT CHANGE UP
GLUCOSE SERPL-MCNC: 119 MG/DL — HIGH (ref 70–99)
GLUCOSE SERPL-MCNC: 175 MG/DL — HIGH (ref 70–99)
HCT VFR BLD CALC: 22.9 % — LOW (ref 34.5–45)
HCT VFR BLD CALC: 34.7 % — SIGNIFICANT CHANGE UP (ref 34.5–45)
HGB BLD-MCNC: 11.3 G/DL — LOW (ref 11.5–15.5)
HGB BLD-MCNC: 7.2 G/DL — LOW (ref 11.5–15.5)
INR BLD: 1.02 RATIO — SIGNIFICANT CHANGE UP (ref 0.88–1.16)
MCHC RBC-ENTMCNC: 29.5 PG — SIGNIFICANT CHANGE UP (ref 27–34)
MCHC RBC-ENTMCNC: 30 PG — SIGNIFICANT CHANGE UP (ref 27–34)
MCHC RBC-ENTMCNC: 31.4 GM/DL — LOW (ref 32–36)
MCHC RBC-ENTMCNC: 32.6 GM/DL — SIGNIFICANT CHANGE UP (ref 32–36)
MCV RBC AUTO: 90.6 FL — SIGNIFICANT CHANGE UP (ref 80–100)
MCV RBC AUTO: 95.4 FL — SIGNIFICANT CHANGE UP (ref 80–100)
NRBC # BLD: 0 /100 WBCS — SIGNIFICANT CHANGE UP (ref 0–0)
NRBC # BLD: 0 /100 WBCS — SIGNIFICANT CHANGE UP (ref 0–0)
PLATELET # BLD AUTO: 122 K/UL — LOW (ref 150–400)
PLATELET # BLD AUTO: 158 K/UL — SIGNIFICANT CHANGE UP (ref 150–400)
POTASSIUM SERPL-MCNC: 4.3 MMOL/L — SIGNIFICANT CHANGE UP (ref 3.5–5.3)
POTASSIUM SERPL-MCNC: 4.5 MMOL/L — SIGNIFICANT CHANGE UP (ref 3.5–5.3)
POTASSIUM SERPL-SCNC: 4.3 MMOL/L — SIGNIFICANT CHANGE UP (ref 3.5–5.3)
POTASSIUM SERPL-SCNC: 4.5 MMOL/L — SIGNIFICANT CHANGE UP (ref 3.5–5.3)
PROTHROM AB SERPL-ACNC: 11.7 SEC — SIGNIFICANT CHANGE UP (ref 10.5–13.4)
RBC # BLD: 2.4 M/UL — LOW (ref 3.8–5.2)
RBC # BLD: 3.83 M/UL — SIGNIFICANT CHANGE UP (ref 3.8–5.2)
RBC # FLD: 11.9 % — SIGNIFICANT CHANGE UP (ref 10.3–14.5)
RBC # FLD: 13.8 % — SIGNIFICANT CHANGE UP (ref 10.3–14.5)
SODIUM SERPL-SCNC: 134 MMOL/L — LOW (ref 135–145)
SODIUM SERPL-SCNC: 135 MMOL/L — SIGNIFICANT CHANGE UP (ref 135–145)
WBC # BLD: 9.54 K/UL — SIGNIFICANT CHANGE UP (ref 3.8–10.5)
WBC # BLD: 9.84 K/UL — SIGNIFICANT CHANGE UP (ref 3.8–10.5)
WBC # FLD AUTO: 9.54 K/UL — SIGNIFICANT CHANGE UP (ref 3.8–10.5)
WBC # FLD AUTO: 9.84 K/UL — SIGNIFICANT CHANGE UP (ref 3.8–10.5)

## 2022-09-11 PROCEDURE — 73551 X-RAY EXAM OF FEMUR 1: CPT | Mod: 26,RT

## 2022-09-11 PROCEDURE — 73502 X-RAY EXAM HIP UNI 2-3 VIEWS: CPT | Mod: 26,76,RT

## 2022-09-11 DEVICE — CABLE CRMP 1.7X750MM: Type: IMPLANTABLE DEVICE | Site: RIGHT | Status: FUNCTIONAL

## 2022-09-11 DEVICE — IMPLANTABLE DEVICE: Type: IMPLANTABLE DEVICE | Site: RIGHT | Status: FUNCTIONAL

## 2022-09-11 RX ORDER — ONDANSETRON 8 MG/1
8 TABLET, FILM COATED ORAL ONCE
Refills: 0 | Status: COMPLETED | OUTPATIENT
Start: 2022-09-11 | End: 2022-09-11

## 2022-09-11 RX ORDER — SODIUM CHLORIDE 9 MG/ML
500 INJECTION, SOLUTION INTRAVENOUS ONCE
Refills: 0 | Status: COMPLETED | OUTPATIENT
Start: 2022-09-11 | End: 2022-09-11

## 2022-09-11 RX ORDER — TOBRAMYCIN 0.3 %
1 DROPS OPHTHALMIC (EYE) ONCE
Refills: 0 | Status: COMPLETED | OUTPATIENT
Start: 2022-09-11 | End: 2022-09-11

## 2022-09-11 RX ORDER — SODIUM CHLORIDE 9 MG/ML
500 INJECTION, SOLUTION INTRAVENOUS ONCE
Refills: 0 | Status: COMPLETED | OUTPATIENT
Start: 2022-09-11 | End: 2022-09-12

## 2022-09-11 RX ORDER — TRAMADOL HYDROCHLORIDE 50 MG/1
50 TABLET ORAL EVERY 6 HOURS
Refills: 0 | Status: DISCONTINUED | OUTPATIENT
Start: 2022-09-11 | End: 2022-09-15

## 2022-09-11 RX ORDER — ACETAMINOPHEN 500 MG
975 TABLET ORAL EVERY 8 HOURS
Refills: 0 | Status: DISCONTINUED | OUTPATIENT
Start: 2022-09-12 | End: 2022-09-15

## 2022-09-11 RX ORDER — ACETAMINOPHEN 500 MG
750 TABLET ORAL ONCE
Refills: 0 | Status: DISCONTINUED | OUTPATIENT
Start: 2022-09-11 | End: 2022-09-11

## 2022-09-11 RX ORDER — POLYETHYLENE GLYCOL 3350 17 G/17G
17 POWDER, FOR SOLUTION ORAL AT BEDTIME
Refills: 0 | Status: DISCONTINUED | OUTPATIENT
Start: 2022-09-11 | End: 2022-09-15

## 2022-09-11 RX ORDER — HYDROMORPHONE HYDROCHLORIDE 2 MG/ML
0.5 INJECTION INTRAMUSCULAR; INTRAVENOUS; SUBCUTANEOUS
Refills: 0 | Status: DISCONTINUED | OUTPATIENT
Start: 2022-09-11 | End: 2022-09-11

## 2022-09-11 RX ORDER — ACETAMINOPHEN 500 MG
1000 TABLET ORAL ONCE
Refills: 0 | Status: COMPLETED | OUTPATIENT
Start: 2022-09-11 | End: 2022-09-11

## 2022-09-11 RX ORDER — SODIUM CHLORIDE 9 MG/ML
1000 INJECTION, SOLUTION INTRAVENOUS
Refills: 0 | Status: DISCONTINUED | OUTPATIENT
Start: 2022-09-11 | End: 2022-09-11

## 2022-09-11 RX ORDER — CEFAZOLIN SODIUM 1 G
2000 VIAL (EA) INJECTION EVERY 8 HOURS
Refills: 0 | Status: COMPLETED | OUTPATIENT
Start: 2022-09-11 | End: 2022-09-12

## 2022-09-11 RX ORDER — PANTOPRAZOLE SODIUM 20 MG/1
40 TABLET, DELAYED RELEASE ORAL
Refills: 0 | Status: DISCONTINUED | OUTPATIENT
Start: 2022-09-11 | End: 2022-09-15

## 2022-09-11 RX ORDER — RIVAROXABAN 15 MG-20MG
10 KIT ORAL DAILY
Refills: 0 | Status: DISCONTINUED | OUTPATIENT
Start: 2022-09-12 | End: 2022-09-15

## 2022-09-11 RX ORDER — HYDROMORPHONE HYDROCHLORIDE 2 MG/ML
1 INJECTION INTRAMUSCULAR; INTRAVENOUS; SUBCUTANEOUS
Refills: 0 | Status: DISCONTINUED | OUTPATIENT
Start: 2022-09-11 | End: 2022-09-11

## 2022-09-11 RX ORDER — ACETAMINOPHEN 500 MG
1000 TABLET ORAL ONCE
Refills: 0 | Status: DISCONTINUED | OUTPATIENT
Start: 2022-09-11 | End: 2022-09-11

## 2022-09-11 RX ADMIN — OXYCODONE HYDROCHLORIDE 2.5 MILLIGRAM(S): 5 TABLET ORAL at 13:07

## 2022-09-11 RX ADMIN — ONDANSETRON 8 MILLIGRAM(S): 8 TABLET, FILM COATED ORAL at 12:18

## 2022-09-11 RX ADMIN — POLYETHYLENE GLYCOL 3350 17 GRAM(S): 17 POWDER, FOR SOLUTION ORAL at 21:09

## 2022-09-11 RX ADMIN — Medication 1 DROP(S): at 17:18

## 2022-09-11 RX ADMIN — Medication 400 MILLIGRAM(S): at 21:58

## 2022-09-11 RX ADMIN — SODIUM CHLORIDE 500 MILLILITER(S): 9 INJECTION, SOLUTION INTRAVENOUS at 11:59

## 2022-09-11 RX ADMIN — OXYCODONE HYDROCHLORIDE 5 MILLIGRAM(S): 5 TABLET ORAL at 21:09

## 2022-09-11 RX ADMIN — SODIUM CHLORIDE 75 MILLILITER(S): 9 INJECTION, SOLUTION INTRAVENOUS at 13:08

## 2022-09-11 RX ADMIN — Medication 650 MILLIGRAM(S): at 05:57

## 2022-09-11 RX ADMIN — OXYCODONE HYDROCHLORIDE 5 MILLIGRAM(S): 5 TABLET ORAL at 03:59

## 2022-09-11 RX ADMIN — OXYCODONE HYDROCHLORIDE 5 MILLIGRAM(S): 5 TABLET ORAL at 04:59

## 2022-09-11 RX ADMIN — OXYCODONE HYDROCHLORIDE 2.5 MILLIGRAM(S): 5 TABLET ORAL at 13:37

## 2022-09-11 RX ADMIN — SODIUM CHLORIDE 500 MILLILITER(S): 9 INJECTION, SOLUTION INTRAVENOUS at 14:30

## 2022-09-11 RX ADMIN — Medication 1 APPLICATION(S): at 21:57

## 2022-09-11 RX ADMIN — Medication 3 MILLIGRAM(S): at 21:10

## 2022-09-11 RX ADMIN — Medication 75 MICROGRAM(S): at 05:57

## 2022-09-11 RX ADMIN — Medication 1000 MILLIGRAM(S): at 04:09

## 2022-09-11 RX ADMIN — Medication 100 MILLIGRAM(S): at 17:17

## 2022-09-11 NOTE — PROGRESS NOTE ADULT - SUBJECTIVE AND OBJECTIVE BOX
Patient  denies sob or chest pain.  NO fever or chills    PAST MEDICAL & SURGICAL HISTORY:  Hypothyroidism  Spinal stenosis      MEDICATIONS  (STANDING):  ceFAZolin   IVPB 2000 milliGRAM(s) IV Intermittent every 8 hours  lactated ringers Bolus 500 milliLiter(s) IV Bolus once  levothyroxine 75 MICROGram(s) Oral daily  multivitamin 1 Tablet(s) Oral daily  pantoprazole    Tablet 40 milliGRAM(s) Oral before breakfast  polyethylene glycol 3350 17 Gram(s) Oral at bedtime    MEDICATIONS  (PRN):  magnesium hydroxide Suspension 30 milliLiter(s) Oral daily PRN Constipation  melatonin 3 milliGRAM(s) Oral at bedtime PRN Insomnia  oxyCODONE    IR 2.5 milliGRAM(s) Oral every 4 hours PRN Moderate Pain (4 - 6)  oxyCODONE    IR 5 milliGRAM(s) Oral every 4 hours PRN Severe Pain (7 - 10)  senna 2 Tablet(s) Oral at bedtime PRN Constipation  traMADol 50 milliGRAM(s) Oral every 6 hours PRN Mild Pain (1 - 3)    Vital Signs Last 24 Hrs  T(C): 37.5 (11 Sep 2022 22:49), Max: 37.6 (11 Sep 2022 15:20)  T(F): 99.5 (11 Sep 2022 22:49), Max: 99.6 (11 Sep 2022 15:20)  HR: 79 (11 Sep 2022 21:34) (64 - 94)  BP: 112/58 (11 Sep 2022 21:34) (96/51 - 144/104)  BP(mean): 85 (11 Sep 2022 14:00) (73 - 117)  RR: 18 (11 Sep 2022 21:34) (16 - 20)  SpO2: 96% (11 Sep 2022 21:34) (95% - 100%)    Parameters below as of 11 Sep 2022 21:34  Patient On (Oxygen Delivery Method): room air    Lungs clear  Heart regular  Abd benign  Ext no cc/c/e  Neuro no fo          CBC Full  -  ( 11 Sep 2022 12:32 )  WBC Count : 9.54 K/uL  RBC Count : 3.83 M/uL  Hemoglobin : 11.3 g/dL  Hematocrit : 34.7 %  Platelet Count - Automated : 122 K/uL  Mean Cell Volume : 90.6 fl  Mean Cell Hemoglobin : 29.5 pg  Mean Cell Hemoglobin Concentration : 32.6 gm/dL  Auto Neutrophil # : x  Auto Lymphocyte # : x  Auto Monocyte # : x  Auto Eosinophil # : x  Auto Basophil # : x  Auto Neutrophil % : x  Auto Lymphocyte % : x  Auto Monocyte % : x  Auto Eosinophil % : x  Auto Basophil % : x        135  |  103  |  26<H>  ----------------------------<  175<H>  4.5   |  23  |  0.91    Ca    7.9<L>      11 Sep 2022 12:32        PT/INR - ( 11 Sep 2022 01:12 )   PT: 11.7 sec;   INR: 1.02 ratio         PTT - ( 11 Sep 2022 01:12 )  PTT:25.1 sec  Urinalysis Basic - ( 10 Sep 2022 18:02 )    Color: Yellow / Appearance: Clear / S.027 / pH: x  Gluc: x / Ketone: Negative  / Bili: Negative / Urobili: Negative   Blood: x / Protein: 30 mg/dL / Nitrite: Negative   Leuk Esterase: Small / RBC: 12 /hpf / WBC 11 /HPF   Sq Epi: x / Non Sq Epi: 3 /hpf / Bacteria: Negative

## 2022-09-11 NOTE — CHART NOTE - NSCHARTNOTEFT_GEN_A_CORE
ORTHOPEDIC POST OPERATIVE CHECK    Patient is s/p Revision right total hip replacement.   Patient received 1U PRBC preop, 2 U PRBC in OR.   Patient evaluated in the RR. Resting without complaints.  Denies Chest Pain, SOB, N/V. Reports pain is well controlled.     T(C): 36.2 (09-11-22 @ 11:23), Max: 37.4 (09-11-22 @ 03:18)  HR: 76 (09-11-22 @ 12:00) (71 - 94)  BP: 142/62 (09-11-22 @ 12:00) (96/51 - 144/104)  RR: 20 (09-11-22 @ 12:00) (16 - 20)  SpO2: 100% (09-11-22 @ 12:00) (95% - 100%)  Wt(kg): --    Exam:  Alert and [place], No Acute Distress  Card: +S1/S2, RRR  Pulm: CTAB  Laterality:  Calves soft, non-tender bilaterally  +PF/DF/EHL/FHL  SILT  + DP pulse    Xray: in chart. Stable right revision total hip replacement, no evidence of periprosthetic fx.                          7.2    9.84  )-----------( 158      ( 11 Sep 2022 01:12 )             22.9    09-11    134<L>  |  101  |  40<H>  ----------------------------<  119<H>  4.3   |  26  |  1.38<H>    Ca    8.5      11 Sep 2022 01:12    TPro  7.2  /  Alb  3.9  /  TBili  0.4  /  DBili  x   /  AST  17  /  ALT  8<L>  /  AlkPhos  55  09-09      A/P: 81y Female S/p revision right total hip replacement.   -PT/OT-NWB RLE With Posterior Hip Precautions/Abduction pillow.   -IS encouraged  -DVT PPx: Eliquis to start tomorrow.   -GI PPx   -Pain Control  -FU PACU CBC and BMP.  -Continue Current Tx  -FU AM labs  -Dispo planning pending PT/OT eval.     Zenaida Bah PA-C  Team Pager: [phone] ORTHOPEDIC POST OPERATIVE CHECK    Patient is s/p Revision right total hip replacement.   Patient received 1U PRBC preop, 2 U PRBC in OR.   Patient evaluated in the RR. Resting without complaints.  Denies Chest Pain, SOB, N/V. Reports pain to operative site.     T(C): 36.2 (09-11-22 @ 11:23), Max: 37.4 (09-11-22 @ 03:18)  HR: 76 (09-11-22 @ 12:00) (71 - 94)  BP: 142/62 (09-11-22 @ 12:00) (96/51 - 144/104)  RR: 20 (09-11-22 @ 12:00) (16 - 20)  SpO2: 100% (09-11-22 @ 12:00) (95% - 100%)  Wt(kg): --    Exam:  Alert and [place], No Acute Distress  Card: +S1/S2, RRR  Pulm: CTAB  Laterality:  Calves soft, non-tender bilaterally  +PF/DF/EHL/FHL  SILT  + DP pulse    Xray: in chart. Stable right revision total hip replacement, no evidence of periprosthetic fx.               11.3   9.54  )-----------( 122      ( 11 Sep 2022 12:32 )             34.7    09-11    135  |  103  |  26<H>  ----------------------------<  175<H>  4.5   |  23  |  0.91    Ca    7.9<L>      11 Sep 2022 12:32    TPro  7.2  /  Alb  3.9  /  TBili  0.4  /  DBili  x   /  AST  17  /  ALT  8<L>  /  AlkPhos  55  09-09                A/P: 81y Female S/p revision right total hip replacement.   -PT/OT-NWB RLE With Posterior Hip Precautions/Abduction pillow.   -IS encouraged  -DVT PPx: Eliquis to start tomorrow.   -GI PPx   -Pain Control  -Continue Current Tx  -FU AM labs  -Dispo planning pending PT/OT eval.     Zenaida Bah PA-C  Team Pager: [phone] ORTHOPEDIC POST OPERATIVE CHECK    Patient is s/p Revision right total hip replacement.   Patient received 1U PRBC preop, 2 U PRBC in OR.   Patient evaluated in the RR. Resting without complaints.  Denies Chest Pain, SOB, N/V. Reports pain to operative site.   Patient with reported left corneal abrasion.     T(C): 36.2 (09-11-22 @ 11:23), Max: 37.4 (09-11-22 @ 03:18)  HR: 76 (09-11-22 @ 12:00) (71 - 94)  BP: 142/62 (09-11-22 @ 12:00) (96/51 - 144/104)  RR: 20 (09-11-22 @ 12:00) (16 - 20)  SpO2: 100% (09-11-22 @ 12:00) (95% - 100%)  Wt(kg): --    Exam:  Alert and [place], No Acute Distress  Card: +S1/S2, RRR  Pulm: CTAB  Laterality:  Right LE:  aquacel to the right hip, stable.   Calves soft, non-tender bilaterally  +PF/DF/EHL/FHL  SILT  + DP pulse    Xray: in chart. Stable right revision total hip replacement, no evidence of periprosthetic fx.               11.3   9.54  )-----------( 122      ( 11 Sep 2022 12:32 )             34.7    09-11    135  |  103  |  26<H>  ----------------------------<  175<H>  4.5   |  23  |  0.91    Ca    7.9<L>      11 Sep 2022 12:32    TPro  7.2  /  Alb  3.9  /  TBili  0.4  /  DBili  x   /  AST  17  /  ALT  8<L>  /  AlkPhos  55  09-09                A/P: 81y Female S/p revision right total hip replacement.   -PT/OT-NWB RLE With Posterior Hip Precautions/Abduction pillow.   -IS encouraged  -DVT PPx: Eliquis to start tomorrow.   -GI PPx   -Pain Control  -Continue Current Tx  -FU AM labs  -Dispo planning pending PT/OT eval.     Zenaida Bah PA-C  Team Pager: [phone] ORTHOPEDIC POST OPERATIVE CHECK    Patient is s/p Revision right total hip replacement.   Patient received 1U PRBC preop, 2 U PRBC in OR.   Patient evaluated in the RR. Resting without complaints.  Denies Chest Pain, SOB, N/V. Reports pain to operative site.   Patient with reported left corneal abrasion.     T(C): 36.2 (09-11-22 @ 11:23), Max: 37.4 (09-11-22 @ 03:18)  HR: 76 (09-11-22 @ 12:00) (71 - 94)  BP: 142/62 (09-11-22 @ 12:00) (96/51 - 144/104)  RR: 20 (09-11-22 @ 12:00) (16 - 20)  SpO2: 100% (09-11-22 @ 12:00) (95% - 100%)  Wt(kg): --    Exam:  Alert and [place], No Acute Distress  Card: +S1/S2, RRR  Pulm: CTAB  Laterality:  Right LE:  aquacel to the right hip, stable.   Calves soft, non-tender bilaterally  +PF/DF/EHL/FHL  SILT  + DP pulse    Xray: in chart. Stable right revision total hip replacement, no evidence of periprosthetic fx.               11.3   9.54  )-----------( 122      ( 11 Sep 2022 12:32 )             34.7    09-11    135  |  103  |  26<H>  ----------------------------<  175<H>  4.5   |  23  |  0.91    Ca    7.9<L>      11 Sep 2022 12:32    TPro  7.2  /  Alb  3.9  /  TBili  0.4  /  DBili  x   /  AST  17  /  ALT  8<L>  /  AlkPhos  55  09-09                A/P: 81y Female S/p revision right total hip replacement.   -PT/OT-NWB RLE With Posterior Hip Precautions/Abduction pillow.   -IS encouraged  -DVT PPx: Xarelto 10mg daily to start tomorrow.   -GI PPx   -Pain Control  -Continue Current Tx  -FU AM labs  -Dispo planning pending PT/OT eval.     Zenaida Bah PA-C  Team Pager: #[phone]

## 2022-09-11 NOTE — PHYSICAL THERAPY INITIAL EVALUATION ADULT - PERTINENT HX OF CURRENT PROBLEM, REHAB EVAL
82 y/o F, from home, ambulates with a cane at baseline, reports no significant PMHx and a PSHx of b/l QUE (10 years ago at [facility]) & Left TKA (10 years ago at [facility]) who presents today c/o right hip pain s/p mechanical fall today. Patient states she was getting something out of the trunk of her car and fell backwards onto her right hip. found to have right vanc b2 periprosthetic hip fx. Pt is now s/p ORIF of malcolm implant and revision of total hip arthroplasty.

## 2022-09-11 NOTE — PHYSICAL THERAPY INITIAL EVALUATION ADULT - ADDITIONAL COMMENTS
PTA pt was independent with functional mobility and ADLs with SC. Pt lived in a PH with her son (son required assist at baseline had HHA), however upon D/C will be staying with daughter

## 2022-09-11 NOTE — BRIEF OPERATIVE NOTE - NSICDXBRIEFPROCEDURE_GEN_ALL_CORE_FT
PROCEDURES:  Open reduction and internal fixation (ORIF) of malcolm-implant fracture of femur 11-Sep-2022 11:28:29 Right Erica Miller  Revision, total arthroplasty, hip, without cement 11-Sep-2022 11:29:06  Erica Miller

## 2022-09-11 NOTE — PROGRESS NOTE ADULT - SUBJECTIVE AND OBJECTIVE BOX
ORTHO PROGRESS NOTE     Pt seen and examined at bedside, denies SOB, CP, Dizziness. N/V/D/HA.    Patient receiving 1U PRBC. Patient reports hip pain, moderately controlled with medications.     Vital Signs Last 24 Hrs  T(C): 36.9 (11 Sep 2022 05:20), Max: 37.4 (11 Sep 2022 03:18)  T(F): 98.4 (11 Sep 2022 05:20), Max: 99.4 (11 Sep 2022 03:18)  HR: 94 (11 Sep 2022 05:20) (71 - 94)  BP: 96/51 (11 Sep 2022 05:20) (96/51 - 113/71)  BP(mean): --  RR: 16 (11 Sep 2022 05:20) (16 - 18)  SpO2: 97% (11 Sep 2022 05:20) (96% - 99%)    Parameters below as of 11 Sep 2022 05:20  Patient On (Oxygen Delivery Method): room air      Gen: No apparent distress  Right LE: Skin intact, leg shortened and externally rotated.   +tenderness to palpation proximal thigh.   BLE: motor intact EHL/FHL/TA/GS.    Sensation is grossly intact to light touch in the distal extremities.    Negative calf tenderness bilaterally.   Compartments are soft bilaterally.  Extremities are warm .  DP 2+ BLE     Labs:  CBC Full  -  ( 11 Sep 2022 01:12 )  WBC Count : 9.84 K/uL  RBC Count : 2.40 M/uL  Hemoglobin : 7.2 g/dL  Hematocrit : 22.9 %  Platelet Count - Automated : 158 K/uL  Mean Cell Volume : 95.4 fl  Mean Cell Hemoglobin : 30.0 pg  Mean Cell Hemoglobin Concentration : 31.4 gm/dL        09-11    134<L>  |  101  |  40<H>  ----------------------------<  119<H>  4.3   |  26  |  1.38<H>    Ca    8.5      11 Sep 2022 01:12    TPro  7.2  /  Alb  3.9  /  TBili  0.4  /  DBili  x   /  AST  17  /  ALT  8<L>  /  AlkPhos  55  09-09          ORTHO PROGRESS NOTE     Pt seen and examined at bedside, denies SOB, CP, Dizziness. N/V/D/HA.    1U PRBC this AM. Patient reports hip/thigh pain, moderately controlled with medications.     Vital Signs Last 24 Hrs  T(C): 36.9 (11 Sep 2022 05:20), Max: 37.4 (11 Sep 2022 03:18)  T(F): 98.4 (11 Sep 2022 05:20), Max: 99.4 (11 Sep 2022 03:18)  HR: 94 (11 Sep 2022 05:20) (71 - 94)  BP: 96/51 (11 Sep 2022 05:20) (96/51 - 113/71)  BP(mean): --  RR: 16 (11 Sep 2022 05:20) (16 - 18)  SpO2: 97% (11 Sep 2022 05:20) (96% - 99%)    Parameters below as of 11 Sep 2022 05:20  Patient On (Oxygen Delivery Method): room air      Gen: No apparent distress  Right LE: Skin intact, leg shortened and externally rotated.   +tenderness to palpation proximal thigh.   BLE: motor intact EHL/FHL/TA/GS.    Sensation is grossly intact to light touch in the distal extremities.    Negative calf tenderness bilaterally.   Compartments are soft bilaterally.  Extremities are warm .  DP 2+ BLE     Labs:  CBC Full  -  ( 11 Sep 2022 01:12 )  WBC Count : 9.84 K/uL  RBC Count : 2.40 M/uL  Hemoglobin : 7.2 g/dL  Hematocrit : 22.9 %  Platelet Count - Automated : 158 K/uL  Mean Cell Volume : 95.4 fl  Mean Cell Hemoglobin : 30.0 pg  Mean Cell Hemoglobin Concentration : 31.4 gm/dL        09-11    134<L>  |  101  |  40<H>  ----------------------------<  119<H>  4.3   |  26  |  1.38<H>    Ca    8.5      11 Sep 2022 01:12    TPro  7.2  /  Alb  3.9  /  TBili  0.4  /  DBili  x   /  AST  17  /  ALT  8<L>  /  AlkPhos  55  09-09

## 2022-09-12 DIAGNOSIS — R52 PAIN, UNSPECIFIED: ICD-10-CM

## 2022-09-12 DIAGNOSIS — E03.9 HYPOTHYROIDISM, UNSPECIFIED: ICD-10-CM

## 2022-09-12 LAB
ANION GAP SERPL CALC-SCNC: 8 MMOL/L — SIGNIFICANT CHANGE UP (ref 5–17)
BUN SERPL-MCNC: 16 MG/DL — SIGNIFICANT CHANGE UP (ref 7–23)
CALCIUM SERPL-MCNC: 8.2 MG/DL — LOW (ref 8.4–10.5)
CHLORIDE SERPL-SCNC: 100 MMOL/L — SIGNIFICANT CHANGE UP (ref 96–108)
CO2 SERPL-SCNC: 27 MMOL/L — SIGNIFICANT CHANGE UP (ref 22–31)
CREAT SERPL-MCNC: 0.79 MG/DL — SIGNIFICANT CHANGE UP (ref 0.5–1.3)
EGFR: 76 ML/MIN/1.73M2 — SIGNIFICANT CHANGE UP
GLUCOSE SERPL-MCNC: 114 MG/DL — HIGH (ref 70–99)
HCT VFR BLD CALC: 27.7 % — LOW (ref 34.5–45)
HGB BLD-MCNC: 9.1 G/DL — LOW (ref 11.5–15.5)
MCHC RBC-ENTMCNC: 29.7 PG — SIGNIFICANT CHANGE UP (ref 27–34)
MCHC RBC-ENTMCNC: 32.9 GM/DL — SIGNIFICANT CHANGE UP (ref 32–36)
MCV RBC AUTO: 90.5 FL — SIGNIFICANT CHANGE UP (ref 80–100)
NRBC # BLD: 0 /100 WBCS — SIGNIFICANT CHANGE UP (ref 0–0)
PLATELET # BLD AUTO: 124 K/UL — LOW (ref 150–400)
POTASSIUM SERPL-MCNC: 4.7 MMOL/L — SIGNIFICANT CHANGE UP (ref 3.5–5.3)
POTASSIUM SERPL-SCNC: 4.7 MMOL/L — SIGNIFICANT CHANGE UP (ref 3.5–5.3)
RBC # BLD: 3.06 M/UL — LOW (ref 3.8–5.2)
RBC # FLD: 14.3 % — SIGNIFICANT CHANGE UP (ref 10.3–14.5)
SODIUM SERPL-SCNC: 135 MMOL/L — SIGNIFICANT CHANGE UP (ref 135–145)
WBC # BLD: 8.2 K/UL — SIGNIFICANT CHANGE UP (ref 3.8–10.5)
WBC # FLD AUTO: 8.2 K/UL — SIGNIFICANT CHANGE UP (ref 3.8–10.5)

## 2022-09-12 RX ADMIN — POLYETHYLENE GLYCOL 3350 17 GRAM(S): 17 POWDER, FOR SOLUTION ORAL at 21:34

## 2022-09-12 RX ADMIN — OXYCODONE HYDROCHLORIDE 5 MILLIGRAM(S): 5 TABLET ORAL at 11:03

## 2022-09-12 RX ADMIN — Medication 75 MICROGRAM(S): at 04:16

## 2022-09-12 RX ADMIN — Medication 1 TABLET(S): at 12:07

## 2022-09-12 RX ADMIN — Medication 975 MILLIGRAM(S): at 10:37

## 2022-09-12 RX ADMIN — OXYCODONE HYDROCHLORIDE 5 MILLIGRAM(S): 5 TABLET ORAL at 05:39

## 2022-09-12 RX ADMIN — RIVAROXABAN 10 MILLIGRAM(S): KIT at 12:07

## 2022-09-12 RX ADMIN — OXYCODONE HYDROCHLORIDE 5 MILLIGRAM(S): 5 TABLET ORAL at 10:37

## 2022-09-12 RX ADMIN — SODIUM CHLORIDE 500 MILLILITER(S): 9 INJECTION, SOLUTION INTRAVENOUS at 04:16

## 2022-09-12 RX ADMIN — Medication 975 MILLIGRAM(S): at 11:02

## 2022-09-12 RX ADMIN — Medication 100 MILLIGRAM(S): at 00:28

## 2022-09-12 RX ADMIN — OXYCODONE HYDROCHLORIDE 5 MILLIGRAM(S): 5 TABLET ORAL at 04:16

## 2022-09-12 RX ADMIN — PANTOPRAZOLE SODIUM 40 MILLIGRAM(S): 20 TABLET, DELAYED RELEASE ORAL at 04:17

## 2022-09-12 RX ADMIN — Medication 975 MILLIGRAM(S): at 17:34

## 2022-09-12 NOTE — DISCHARGE NOTE PROVIDER - HOSPITAL COURSE
Reason for Admission: R Periprosthetic Hip Fx  History of Present Illness:   Orthopedics    82 y/o F, from home, ambulates with a cane at baseline, reports no significant PMHx and a PSHx of b/l QUE (10 years ago at [facility]) & Left TKA (10 years ago at [facility]) who presents today c/o right hip pain s/p mechanical fall today. Patient states she was getting something out of the trunk of her car and fell backwards onto her right hip, denies any head trauma, LOC, or syncope. After the fall patient was unable to ambulate or stand and was brought in by EMS. Patient states the pain is located over right hip with radiation to groin and knee, exacerbated with movement of the right leg. Pt denies Chest pain, SOB, dyspnea, paresthesias, N/V/D, abdominal pain, syncope, or pain anywhere else.      Past Medical, Past Surgical History:  PAST MEDICAL HISTORY:  Hypothyroidism   Spinal stenosis.    PAST SURGICAL HISTORY:  B/L QUE  Left TKA    HOSPITAL COURSE:  82 y/o FM underwent Right QUE revision on 9/11/2022 with Dr. Logan.  Patient tolerated procedure well.  Patient was transfused PRBCs perioperatively for acute blood loss anemia. Patient was evaluated postoperatively by physical and occupational therapists for non-weight bearing ambulation on right lower extremity and advised that patient would benefit from admission to rehab facility.  Patient advised to keep surgical incision/dressing clean and dry, and have dressing and surgical staples removed and steri strips applied post op day #14(9/25/2022).  Patient further advised to follow up with Dr. Logan in his office 3-4 weeks post op.     Reason for Admission: R Periprosthetic Hip Fx  History of Present Illness:   Orthopedics    80 y/o F, from home, ambulates with a cane at baseline, reports no significant PMHx and a PSHx of b/l QUE ([facility] ago at hospitals) & Left TKA (10 years ago at hospitals) who presents today c/o right hip pain s/p mechanical fall today. Patient states she was getting something out of the trunk of her car and fell backwards onto her right hip, denies any head trauma, LOC, or syncope. After the fall patient was unable to ambulate or stand and was brought in by EMS. Patient states the pain is located over right hip with radiation to groin and knee, exacerbated with movement of the right leg. Pt denies Chest pain, SOB, dyspnea, paresthesias, N/V/D, abdominal pain, syncope, or pain anywhere else.      Past Medical, Past Surgical History:  PAST MEDICAL HISTORY:  Hypothyroidism   Spinal stenosis.    PAST SURGICAL HISTORY:  B/L QUE  Left TKA    HOSPITAL COURSE:  80 y/o FM underwent Right QUE revision on 9/11/2022 with Dr. Logan.  Patient tolerated procedure well.  Patient was transfused PRBCs perioperatively for acute blood loss anemia. Patient was evaluated postoperatively by physical and occupational therapists for non-weight bearing ambulation on right lower extremity and advised that patient would benefit from admission to rehab facility.  Patient was found to be in urinary retention and had Gutierrez re-inserted on 9/15/2022, patient should have new trial of Void once she is more ambulatory. Patient advised to keep surgical incision/dressing clean and dry, and have dressing and surgical staples removed and steri strips applied post op day #14(9/25/2022).  Patient further advised to follow up with Dr. Logan in his office 3-4 weeks post op.

## 2022-09-12 NOTE — DISCHARGE NOTE PROVIDER - NSDCCPTREATMENT_GEN_ALL_CORE_FT
PRINCIPAL PROCEDURE  Procedure: Open reduction and internal fixation (ORIF) of malcolm-implant fracture of femur  Findings and Treatment: Right      SECONDARY PROCEDURE  Procedure: Revision, total arthroplasty, hip, without cement  Findings and Treatment:

## 2022-09-12 NOTE — PROGRESS NOTE ADULT - SUBJECTIVE AND OBJECTIVE BOX
Patient is a 80 yo woman who was lost her ballance and fell backwards.  Patient was taken to the emergency department there and diagnosed with a periprosthetic right hip fracture.  Patient is complaining of severe pain.  She denies any other injury.  She denies any headache, dizziness, nausea or vomiting.  She denies any new neck or back pain.  She has chronic pain in the lower back from spinal stenosis.  She denies any chest/rib pain.  She denies any abdominal pain.  She denies any other extremity pain or injury.  She has no numbness or paresthesia.  EMS reports that patient was tachycardic on departure from [place] and was given morphine for pain.  Patient only reports history of hypothyroidism.  She denies any CAD, CVA, COPD, diabetes, hypertension, hyperlipidemia. Patient seen now s/p an Open reduction and internal fixation of a right periprosthetic femur fracture. Patient tolerated the procedure well. seem OOB sitting in a chair     MEDICATIONS  (STANDING):  acetaminophen     Tablet .. 975 milliGRAM(s) Oral every 8 hours  levothyroxine 75 MICROGram(s) Oral daily  multivitamin 1 Tablet(s) Oral daily  pantoprazole    Tablet 40 milliGRAM(s) Oral before breakfast  polyethylene glycol 3350 17 Gram(s) Oral at bedtime  rivaroxaban 10 milliGRAM(s) Oral daily    MEDICATIONS  (PRN):  magnesium hydroxide Suspension 30 milliLiter(s) Oral daily PRN Constipation  melatonin 3 milliGRAM(s) Oral at bedtime PRN Insomnia  oxyCODONE    IR 2.5 milliGRAM(s) Oral every 4 hours PRN Moderate Pain (4 - 6)  oxyCODONE    IR 5 milliGRAM(s) Oral every 4 hours PRN Severe Pain (7 - 10)  senna 2 Tablet(s) Oral at bedtime PRN Constipation  traMADol 50 milliGRAM(s) Oral every 6 hours PRN Mild Pain (1 - 3)          VITALS:   T(C): 37.1 (22 @ 04:15), Max: 37.5 (22 @ 22:49)  HR: 80 (22 @ 04:15) (79 - 82)  BP: 91/52 (22 @ 04:15) (91/52 - 117/66)  RR: 16 (22 @ 04:15) (16 - 18)  SpO2: 95% (22 @ 04:15) (95% - 98%)  Wt(kg): --        LABS:        CBC Full  -  ( 12 Sep 2022 10:49 )  WBC Count : 8.20 K/uL  RBC Count : 3.06 M/uL  Hemoglobin : 9.1 g/dL  Hematocrit : 27.7 %  Platelet Count - Automated : 124 K/uL  Mean Cell Volume : 90.5 fl  Mean Cell Hemoglobin : 29.7 pg  Mean Cell Hemoglobin Concentration : 32.9 gm/dL  Auto Neutrophil # : x  Auto Lymphocyte # : x  Auto Monocyte # : x  Auto Eosinophil # : x  Auto Basophil # : x  Auto Neutrophil % : x  Auto Lymphocyte % : x  Auto Monocyte % : x  Auto Eosinophil % : x  Auto Basophil % : x        135  |  100  |  16  ----------------------------<  114<H>  4.7   |  27  |  0.79    Ca    8.2<L>      12 Sep 2022 10:49        PT/INR - ( 11 Sep 2022 01:12 )   PT: 11.7 sec;   INR: 1.02 ratio         PTT - ( 11 Sep 2022 01:12 )  PTT:25.1 sec  Urinalysis Basic - ( 10 Sep 2022 18:02 )    Color: Yellow / Appearance: Clear / S.027 / pH: x  Gluc: x / Ketone: Negative  / Bili: Negative / Urobili: Negative   Blood: x / Protein: 30 mg/dL / Nitrite: Negative   Leuk Esterase: Small / RBC: 12 /hpf / WBC 11 /HPF   Sq Epi: x / Non Sq Epi: 3 /hpf / Bacteria: Negative      CAPILLARY BLOOD GLUCOSE          RADIOLOGY & ADDITIONAL TESTS:

## 2022-09-12 NOTE — DISCHARGE NOTE PROVIDER - NSDCMRMEDTOKEN_GEN_ALL_CORE_FT
levothyroxine 75 mcg (0.075 mg) oral tablet: 1 tab(s) orally once a day   acetaminophen 325 mg oral tablet: 3 tab(s) orally every 8 hours as needed for fever, H/A, mild pain  levothyroxine 75 mcg (0.075 mg) oral tablet: 1 tab(s) orally once a day  oxyCODONE 5 mg oral tablet: 1 tab(s) orally every 4 hours, As needed, Severe Pain (7 - 10)  rivaroxaban 10 mg oral tablet: 1 tab(s) orally once a day  traMADol 50 mg oral tablet: 1 tab(s) orally every 6 hours, As needed, moderate pain

## 2022-09-12 NOTE — DISCHARGE NOTE PROVIDER - NSDCCPCAREPLAN_GEN_ALL_CORE_FT
PRINCIPAL DISCHARGE DIAGNOSIS  Diagnosis: Periprosthetic fracture of shaft of femur  Assessment and Plan of Treatment:

## 2022-09-12 NOTE — DISCHARGE NOTE PROVIDER - CARE PROVIDER_API CALL
Alex Logan)  Orthopaedic Surgery  [address] [place] [address], [address]  [place] [address]  Phone: [phone]  Fax: [phone]  Follow Up Time:

## 2022-09-12 NOTE — PROGRESS NOTE ADULT - SUBJECTIVE AND OBJECTIVE BOX
S: Pt seen and examined. Doing well postoperatively. Pain well controlled. Denies N/V/CP/SOB.       O:   PE:  Gen: NAD, laying comfortably in bed  Resp: Unlabored breathing  MSK: Dressing c/d/i          +EHL/FHL/TA/Gas/SOl          +DP/SP/Mario/Saph           2+DP  ABduction pillow in place                        11.3   9.54  )-----------( 122      ( 11 Sep 2022 12:32 )             34.7     09-11    135  |  103  |  26<H>  ----------------------------<  175<H>  4.5   |  23  |  0.91    Ca    7.9<L>      11 Sep 2022 12:32        Vital Signs Last 24 Hrs  T(C): 37.1 (12 Sep 2022 04:15), Max: 37.6 (11 Sep 2022 15:20)  T(F): 98.7 (12 Sep 2022 04:15), Max: 99.6 (11 Sep 2022 15:20)  HR: 80 (12 Sep 2022 04:15) (64 - 86)  BP: 91/52 (12 Sep 2022 04:15) (91/52 - 144/104)  BP(mean): 65 (12 Sep 2022 04:15) (65 - 117)  RR: 16 (12 Sep 2022 04:15) (16 - 20)  SpO2: 95% (12 Sep 2022 04:15) (95% - 100%)    Parameters below as of 12 Sep 2022 04:15  Patient On (Oxygen Delivery Method): room air      I&O's Summary    10 Sep 2022 07:01  -  11 Sep 2022 07:00  --------------------------------------------------------  IN: 240 mL / OUT: 400 mL / NET: -160 mL    11 Sep 2022 07:01  -  12 Sep 2022 06:09  --------------------------------------------------------  IN: 890 mL / OUT: 960 mL / NET: -70 mL           A/P: 81y Female S/p revision right total hip replacement, progressing well.    -PT/OT-NWB RLE With Posterior Hip Precautions/Abduction pillow.   -IS encouraged  -DVT PPx: Xarelto 10mg daily to start tomorrow.   -GI PPx   -Pain Control  -Continue Current Tx  -FU AM labs  -Dispo planning pending PT/OT eval.       Ortho

## 2022-09-12 NOTE — DISCHARGE NOTE PROVIDER - NSDCFUADDINST_GEN_ALL_CORE_FT
Maintain non-weight bearing on right lower extremity. Keep surgical incision/dressing clean and dry, and maintain posterior total hip precautions. Have dressing/sutures/staples removed and steri-strips applied post op day #15 (9/25/2022) Follow up with Dr. Logan in his office 3-4 weeks post op.

## 2022-09-12 NOTE — OCCUPATIONAL THERAPY INITIAL EVALUATION ADULT - PERTINENT HX OF CURRENT PROBLEM, REHAB EVAL
82 y/o F, from home, ambulates with a cane at baseline, reports no significant PMHx and a PSHx of b/l QUE (10 years ago at [facility]) & Left TKA (10 years ago at [facility]) who presents today c/o right hip pain s/p mechanical fall today. Patient states she was getting something out of the trunk of her car and fell backwards onto her right hip. found to have right vanc b2 periprosthetic hip fx. Pt is now s/p ORIF of malcolm implant and revision of R total hip arthroplasty.

## 2022-09-13 DIAGNOSIS — D62 ACUTE POSTHEMORRHAGIC ANEMIA: ICD-10-CM

## 2022-09-13 LAB
ANION GAP SERPL CALC-SCNC: 7 MMOL/L — SIGNIFICANT CHANGE UP (ref 5–17)
BLD GP AB SCN SERPL QL: NEGATIVE — SIGNIFICANT CHANGE UP
BUN SERPL-MCNC: 14 MG/DL — SIGNIFICANT CHANGE UP (ref 7–23)
CALCIUM SERPL-MCNC: 7.9 MG/DL — LOW (ref 8.4–10.5)
CHLORIDE SERPL-SCNC: 101 MMOL/L — SIGNIFICANT CHANGE UP (ref 96–108)
CO2 SERPL-SCNC: 26 MMOL/L — SIGNIFICANT CHANGE UP (ref 22–31)
CREAT SERPL-MCNC: 0.61 MG/DL — SIGNIFICANT CHANGE UP (ref 0.5–1.3)
EGFR: 91 ML/MIN/1.73M2 — SIGNIFICANT CHANGE UP
GLUCOSE SERPL-MCNC: 88 MG/DL — SIGNIFICANT CHANGE UP (ref 70–99)
HCT VFR BLD CALC: 23.8 % — LOW (ref 34.5–45)
HGB BLD-MCNC: 7.9 G/DL — LOW (ref 11.5–15.5)
MCHC RBC-ENTMCNC: 30.3 PG — SIGNIFICANT CHANGE UP (ref 27–34)
MCHC RBC-ENTMCNC: 33.2 GM/DL — SIGNIFICANT CHANGE UP (ref 32–36)
MCV RBC AUTO: 91.2 FL — SIGNIFICANT CHANGE UP (ref 80–100)
NRBC # BLD: 0 /100 WBCS — SIGNIFICANT CHANGE UP (ref 0–0)
PLATELET # BLD AUTO: 144 K/UL — LOW (ref 150–400)
POTASSIUM SERPL-MCNC: 4.6 MMOL/L — SIGNIFICANT CHANGE UP (ref 3.5–5.3)
POTASSIUM SERPL-SCNC: 4.6 MMOL/L — SIGNIFICANT CHANGE UP (ref 3.5–5.3)
RBC # BLD: 2.61 M/UL — LOW (ref 3.8–5.2)
RBC # FLD: 14 % — SIGNIFICANT CHANGE UP (ref 10.3–14.5)
RH IG SCN BLD-IMP: POSITIVE — SIGNIFICANT CHANGE UP
SARS-COV-2 RNA SPEC QL NAA+PROBE: SIGNIFICANT CHANGE UP
SODIUM SERPL-SCNC: 134 MMOL/L — LOW (ref 135–145)
WBC # BLD: 8.54 K/UL — SIGNIFICANT CHANGE UP (ref 3.8–10.5)
WBC # FLD AUTO: 8.54 K/UL — SIGNIFICANT CHANGE UP (ref 3.8–10.5)

## 2022-09-13 RX ADMIN — Medication 3 MILLIGRAM(S): at 23:01

## 2022-09-13 RX ADMIN — Medication 975 MILLIGRAM(S): at 20:52

## 2022-09-13 RX ADMIN — TRAMADOL HYDROCHLORIDE 50 MILLIGRAM(S): 50 TABLET ORAL at 22:00

## 2022-09-13 RX ADMIN — Medication 975 MILLIGRAM(S): at 07:20

## 2022-09-13 RX ADMIN — RIVAROXABAN 10 MILLIGRAM(S): KIT at 11:10

## 2022-09-13 RX ADMIN — TRAMADOL HYDROCHLORIDE 50 MILLIGRAM(S): 50 TABLET ORAL at 20:54

## 2022-09-13 RX ADMIN — PANTOPRAZOLE SODIUM 40 MILLIGRAM(S): 20 TABLET, DELAYED RELEASE ORAL at 06:19

## 2022-09-13 RX ADMIN — POLYETHYLENE GLYCOL 3350 17 GRAM(S): 17 POWDER, FOR SOLUTION ORAL at 20:53

## 2022-09-13 RX ADMIN — Medication 75 MICROGRAM(S): at 06:21

## 2022-09-13 RX ADMIN — OXYCODONE HYDROCHLORIDE 2.5 MILLIGRAM(S): 5 TABLET ORAL at 23:01

## 2022-09-13 RX ADMIN — Medication 975 MILLIGRAM(S): at 22:00

## 2022-09-13 RX ADMIN — Medication 975 MILLIGRAM(S): at 13:35

## 2022-09-13 RX ADMIN — Medication 975 MILLIGRAM(S): at 06:19

## 2022-09-13 RX ADMIN — Medication 975 MILLIGRAM(S): at 14:35

## 2022-09-13 RX ADMIN — Medication 1 TABLET(S): at 11:11

## 2022-09-13 NOTE — PROGRESS NOTE ADULT - SUBJECTIVE AND OBJECTIVE BOX
S: Pt seen and examined. Doing well postoperatively. Pain well controlled. Denies N/V/CP/SOB. OOB to chair with PT.      O:   Vital Signs Last 24 Hrs  T(C): 36.8 (13 Sep 2022 04:18), Max: 36.9 (12 Sep 2022 15:47)  T(F): 98.3 (13 Sep 2022 04:18), Max: 98.5 (12 Sep 2022 15:47)  HR: 81 (13 Sep 2022 04:18) (78 - 81)  BP: 100/60 (13 Sep 2022 04:18) (100/60 - 111/61)  BP(mean): --  RR: 18 (13 Sep 2022 04:18) (18 - 18)  SpO2: 93% (13 Sep 2022 04:18) (93% - 100%)    Parameters below as of 13 Sep 2022 04:18  Patient On (Oxygen Delivery Method): room air        PE:  Gen: NAD, laying comfortably in bed  Resp: Unlabored breathing  MSK: Dressing c/d/i          +EHL/FHL/TA/Gas/SOl          +DP/SP/Mario/Saph           2+DP  ABduction pillow in place                                            9.1    8.20  )-----------( 124      ( 12 Sep 2022 10:49 )             27.7            A/P: 81y Female S/p revision right total hip replacement, progressing well.    -PT/OT-NWB RLE With Posterior Hip Precautions/Abduction pillow.   -IS encouraged  -DVT PPx: Xarelto 10mg daily   -GI PPx   -Pain Control  -FU AM labs  -Dispo planning: [place]      For all questions related to patient care, please reach out to the on-call team via the pager.     Tessa Schofield PGY-3  Orthopaedic Surgery  [facility] [id number]  [facility] [id number]  [facility] [id number]

## 2022-09-13 NOTE — PROGRESS NOTE ADULT - SUBJECTIVE AND OBJECTIVE BOX
Patient is a 82 yo woman who was lost her ballance and fell backwards.  Patient was taken to the emergency department there and diagnosed with a periprosthetic right hip fracture.  Patient is complaining of severe pain.  She denies any other injury.  She denies any headache, dizziness, nausea or vomiting.  She denies any new neck or back pain.  She has chronic pain in the lower back from spinal stenosis.  She denies any chest/rib pain.  She denies any abdominal pain.  She denies any other extremity pain or injury.  She has no numbness or paresthesia.  EMS reports that patient was tachycardic on departure from [place] and was given morphine for pain.  Patient only reports history of hypothyroidism.  She denies any CAD, CVA, COPD, diabetes, hypertension, hyperlipidemia. Patient seen now s/p an Open reduction and internal fixation of a right periprosthetic femur fracture. Patient tolerated the procedure well. Patient states pain has been well controlled.       MEDICATIONS  (STANDING):  acetaminophen     Tablet .. 975 milliGRAM(s) Oral every 8 hours  levothyroxine 75 MICROGram(s) Oral daily  multivitamin 1 Tablet(s) Oral daily  pantoprazole    Tablet 40 milliGRAM(s) Oral before breakfast  polyethylene glycol 3350 17 Gram(s) Oral at bedtime  rivaroxaban 10 milliGRAM(s) Oral daily    MEDICATIONS  (PRN):  magnesium hydroxide Suspension 30 milliLiter(s) Oral daily PRN Constipation  melatonin 3 milliGRAM(s) Oral at bedtime PRN Insomnia  oxyCODONE    IR 2.5 milliGRAM(s) Oral every 4 hours PRN Moderate Pain (4 - 6)  oxyCODONE    IR 5 milliGRAM(s) Oral every 4 hours PRN Severe Pain (7 - 10)  senna 2 Tablet(s) Oral at bedtime PRN Constipation  traMADol 50 milliGRAM(s) Oral every 6 hours PRN Mild Pain (1 - 3)          VITALS:   T(C): 36.7 (09-13-22 @ 08:23), Max: 36.9 (09-12-22 @ 15:47)  HR: 85 (09-13-22 @ 08:23) (78 - 85)  BP: 99/63 (09-13-22 @ 08:23) (99/63 - 111/61)  RR: 18 (09-13-22 @ 08:23) (18 - 18)  SpO2: 95% (09-13-22 @ 08:23) (93% - 100%)  Wt(kg): --    PHYSICAL EXAM:  GENERAL: NAD, well nourished and conversant  HEAD:  Atraumatic  EYES: EOM, PERRLA, conjunctiva pink and sclera white  ENT: No tonsillar erythema, exudates, or enlargement, moist mucous membranes, good dentition, no lesions  NECK: Supple, No JVD, normal thyroid, carotids with normal upstrokes and no bruits  CHEST/LUNG: Clear to auscultation bilaterally, No rales, rhonchi, wheezing, or rubs  HEART: Regular rate and rhythm, No murmurs, rubs, or gallops  ABDOMEN: Soft, nondistended, no masses, guarding, tenderness or rebound, bowel sounds present  EXTREMITIES:  2+ Peripheral Pulses, No clubbing, cyanosis, or edema.   LYMPH: No lymphadenopathy noted  SKIN: No rashes or lesions  NERVOUS SYSTEM:  Alert & Oriented X3, normal cognitive function. Motor Strength 5/5 right upper and right lower.  5/5 left upper and left lower extremities, DTRs 2+ intact and symmetric      LABS:        CBC Full  -  ( 13 Sep 2022 07:25 )  WBC Count : 8.54 K/uL  RBC Count : 2.61 M/uL  Hemoglobin : 7.9 g/dL  Hematocrit : 23.8 %  Platelet Count - Automated : 144 K/uL  Mean Cell Volume : 91.2 fl  Mean Cell Hemoglobin : 30.3 pg  Mean Cell Hemoglobin Concentration : 33.2 gm/dL  Auto Neutrophil # : x  Auto Lymphocyte # : x  Auto Monocyte # : x  Auto Eosinophil # : x  Auto Basophil # : x  Auto Neutrophil % : x  Auto Lymphocyte % : x  Auto Monocyte % : x  Auto Eosinophil % : x  Auto Basophil % : x    09-13    134<L>  |  101  |  14  ----------------------------<  88  4.6   |  26  |  0.61    Ca    7.9<L>      13 Sep 2022 07:26            CAPILLARY BLOOD GLUCOSE          RADIOLOGY & ADDITIONAL TESTS:

## 2022-09-14 LAB
ANION GAP SERPL CALC-SCNC: 7 MMOL/L — SIGNIFICANT CHANGE UP (ref 5–17)
BUN SERPL-MCNC: 14 MG/DL — SIGNIFICANT CHANGE UP (ref 7–23)
CALCIUM SERPL-MCNC: 8.1 MG/DL — LOW (ref 8.4–10.5)
CHLORIDE SERPL-SCNC: 102 MMOL/L — SIGNIFICANT CHANGE UP (ref 96–108)
CO2 SERPL-SCNC: 26 MMOL/L — SIGNIFICANT CHANGE UP (ref 22–31)
CREAT SERPL-MCNC: 0.62 MG/DL — SIGNIFICANT CHANGE UP (ref 0.5–1.3)
EGFR: 91 ML/MIN/1.73M2 — SIGNIFICANT CHANGE UP
GLUCOSE SERPL-MCNC: 85 MG/DL — SIGNIFICANT CHANGE UP (ref 70–99)
HCT VFR BLD CALC: 26.4 % — LOW (ref 34.5–45)
HGB BLD-MCNC: 8.5 G/DL — LOW (ref 11.5–15.5)
MCHC RBC-ENTMCNC: 29.2 PG — SIGNIFICANT CHANGE UP (ref 27–34)
MCHC RBC-ENTMCNC: 32.2 GM/DL — SIGNIFICANT CHANGE UP (ref 32–36)
MCV RBC AUTO: 90.7 FL — SIGNIFICANT CHANGE UP (ref 80–100)
NRBC # BLD: 0 /100 WBCS — SIGNIFICANT CHANGE UP (ref 0–0)
PLATELET # BLD AUTO: 192 K/UL — SIGNIFICANT CHANGE UP (ref 150–400)
POTASSIUM SERPL-MCNC: 4.5 MMOL/L — SIGNIFICANT CHANGE UP (ref 3.5–5.3)
POTASSIUM SERPL-SCNC: 4.5 MMOL/L — SIGNIFICANT CHANGE UP (ref 3.5–5.3)
RBC # BLD: 2.91 M/UL — LOW (ref 3.8–5.2)
RBC # FLD: 14.5 % — SIGNIFICANT CHANGE UP (ref 10.3–14.5)
SODIUM SERPL-SCNC: 135 MMOL/L — SIGNIFICANT CHANGE UP (ref 135–145)
WBC # BLD: 7.8 K/UL — SIGNIFICANT CHANGE UP (ref 3.8–10.5)
WBC # FLD AUTO: 7.8 K/UL — SIGNIFICANT CHANGE UP (ref 3.8–10.5)

## 2022-09-14 PROCEDURE — 72170 X-RAY EXAM OF PELVIS: CPT | Mod: 26

## 2022-09-14 RX ADMIN — OXYCODONE HYDROCHLORIDE 2.5 MILLIGRAM(S): 5 TABLET ORAL at 13:36

## 2022-09-14 RX ADMIN — Medication 75 MICROGRAM(S): at 05:39

## 2022-09-14 RX ADMIN — RIVAROXABAN 10 MILLIGRAM(S): KIT at 11:32

## 2022-09-14 RX ADMIN — OXYCODONE HYDROCHLORIDE 2.5 MILLIGRAM(S): 5 TABLET ORAL at 00:00

## 2022-09-14 RX ADMIN — PANTOPRAZOLE SODIUM 40 MILLIGRAM(S): 20 TABLET, DELAYED RELEASE ORAL at 05:36

## 2022-09-14 RX ADMIN — Medication 975 MILLIGRAM(S): at 13:36

## 2022-09-14 RX ADMIN — Medication 1 TABLET(S): at 11:33

## 2022-09-14 RX ADMIN — Medication 975 MILLIGRAM(S): at 22:48

## 2022-09-14 RX ADMIN — Medication 975 MILLIGRAM(S): at 22:18

## 2022-09-14 RX ADMIN — Medication 975 MILLIGRAM(S): at 05:36

## 2022-09-14 RX ADMIN — OXYCODONE HYDROCHLORIDE 2.5 MILLIGRAM(S): 5 TABLET ORAL at 22:48

## 2022-09-14 RX ADMIN — OXYCODONE HYDROCHLORIDE 2.5 MILLIGRAM(S): 5 TABLET ORAL at 14:36

## 2022-09-14 RX ADMIN — OXYCODONE HYDROCHLORIDE 2.5 MILLIGRAM(S): 5 TABLET ORAL at 22:19

## 2022-09-14 RX ADMIN — Medication 975 MILLIGRAM(S): at 14:36

## 2022-09-14 NOTE — PROGRESS NOTE ADULT - SUBJECTIVE AND OBJECTIVE BOX
Patient is a 82 yo woman who was lost her ballance and fell backwards.  Patient was taken to the emergency department there and diagnosed with a periprosthetic right hip fracture.  Patient is complaining of severe pain.  She denies any other injury.  She denies any headache, dizziness, nausea or vomiting.  She denies any new neck or back pain.  She has chronic pain in the lower back from spinal stenosis.  She denies any chest/rib pain.  She denies any abdominal pain.  She denies any other extremity pain or injury.  She has no numbness or paresthesia.  EMS reports that patient was tachycardic on departure from [place] and was given morphine for pain.  Patient only reports history of hypothyroidism.  She denies any CAD, CVA, COPD, diabetes, hypertension, hyperlipidemia. Patient seen now s/p an Open reduction and internal fixation of a right periprosthetic femur fracture. Patient tolerated the procedure well. Patient states pain has been well controlled. Has started working with physical therapy       MEDICATIONS  (STANDING):  acetaminophen     Tablet .. 975 milliGRAM(s) Oral every 8 hours  levothyroxine 75 MICROGram(s) Oral daily  multivitamin 1 Tablet(s) Oral daily  pantoprazole    Tablet 40 milliGRAM(s) Oral before breakfast  polyethylene glycol 3350 17 Gram(s) Oral at bedtime  rivaroxaban 10 milliGRAM(s) Oral daily    MEDICATIONS  (PRN):  magnesium hydroxide Suspension 30 milliLiter(s) Oral daily PRN Constipation  melatonin 3 milliGRAM(s) Oral at bedtime PRN Insomnia  oxyCODONE    IR 5 milliGRAM(s) Oral every 4 hours PRN Severe Pain (7 - 10)  oxyCODONE    IR 2.5 milliGRAM(s) Oral every 4 hours PRN Moderate Pain (4 - 6)  senna 2 Tablet(s) Oral at bedtime PRN Constipation  traMADol 50 milliGRAM(s) Oral every 6 hours PRN Mild Pain (1 - 3)          VITALS:   T(C): 36.7 (09-14-22 @ 13:08), Max: 37.4 (09-13-22 @ 20:45)  HR: 77 (09-14-22 @ 13:08) (76 - 89)  BP: 99/63 (09-14-22 @ 13:08) (94/57 - 104/66)  RR: 18 (09-14-22 @ 13:08) (18 - 18)  SpO2: 94% (09-14-22 @ 13:08) (94% - 99%)  Wt(kg): --    PHYSICAL EXAM:  GENERAL: NAD, well nourished and conversant  HEAD:  Atraumatic  EYES: EOM, PERRLA, conjunctiva pink and sclera white  ENT: No tonsillar erythema, exudates, or enlargement, moist mucous membranes, good dentition, no lesions  NECK: Supple, No JVD, normal thyroid, carotids with normal upstrokes and no bruits  CHEST/LUNG: Clear to auscultation bilaterally, No rales, rhonchi, wheezing, or rubs  HEART: Regular rate and rhythm, No murmurs, rubs, or gallops  ABDOMEN: Soft, nondistended, no masses, guarding, tenderness or rebound, bowel sounds present  EXTREMITIES:  2+ Peripheral Pulses, No clubbing, cyanosis, or edema.   LYMPH: No lymphadenopathy noted  SKIN: No rashes or lesions  NERVOUS SYSTEM:  Alert & Oriented X3, normal cognitive function. Motor Strength 5/5 right upper and right lower.  5/5 left upper and left lower extremities, DTRs 2+ intact and symmetric    LABS:        CBC Full  -  ( 14 Sep 2022 07:10 )  WBC Count : 7.80 K/uL  RBC Count : 2.91 M/uL  Hemoglobin : 8.5 g/dL  Hematocrit : 26.4 %  Platelet Count - Automated : 192 K/uL  Mean Cell Volume : 90.7 fl  Mean Cell Hemoglobin : 29.2 pg  Mean Cell Hemoglobin Concentration : 32.2 gm/dL  Auto Neutrophil # : x  Auto Lymphocyte # : x  Auto Monocyte # : x  Auto Eosinophil # : x  Auto Basophil # : x  Auto Neutrophil % : x  Auto Lymphocyte % : x  Auto Monocyte % : x  Auto Eosinophil % : x  Auto Basophil % : x    09-14    135  |  102  |  14  ----------------------------<  85  4.5   |  26  |  0.62    Ca    8.1<L>      14 Sep 2022 07:10            CAPILLARY BLOOD GLUCOSE          RADIOLOGY & ADDITIONAL TESTS:

## 2022-09-14 NOTE — PROGRESS NOTE ADULT - SUBJECTIVE AND OBJECTIVE BOX
S: Pt seen and examined. Doing well postoperatively. Pain well controlled. Denies N/V/CP/SOB. OOB to chair with PT. 1U given yesterday for low H/H      ICU Vital Signs Last 24 Hrs  T(C): 36.8 (14 Sep 2022 04:45), Max: 37.4 (13 Sep 2022 20:45)  T(F): 98.2 (14 Sep 2022 04:45), Max: 99.3 (13 Sep 2022 20:45)  HR: 76 (14 Sep 2022 04:45) (76 - 99)  BP: 104/66 (14 Sep 2022 04:45) (94/57 - 123/73)  BP(mean): --  ABP: --  ABP(mean): --  RR: 18 (14 Sep 2022 04:45) (17 - 18)  SpO2: 98% (14 Sep 2022 04:45) (95% - 99%)    O2 Parameters below as of 14 Sep 2022 04:45  Patient On (Oxygen Delivery Method): room air              PE:  Gen: NAD, laying comfortably in bed  Resp: Unlabored breathing  MSK: Dressing c/d/i          +EHL/FHL/TA/Gas/SOl          +DP/SP/Mario/Saph           2+DP  ABduction pillow in place                                   7.9    8.54  )-----------( 144      ( 13 Sep 2022 07:25 )             23.8              A/P: 81y Female S/p revision right total hip replacement, progressing well.    -PT/OT-NWB RLE With Posterior Hip Precautions/Abduction pillow.   -IS encouraged  -DVT PPx: Xarelto 10mg daily   -GI PPx   -Pain Control  -FU AM labs  -Dispo planning: [facility]      For all questions related to patient care, please reach out to the on-call team via the pager.

## 2022-09-14 NOTE — CHART NOTE - NSCHARTNOTEFT_GEN_A_CORE
Called to bedside by nursing due to concern for operative extremity positioning.   Patient was up and out of bed, found to have internal rotation of the operative leg.   Examined patient at the bedside, patient supine resting comfortably.   Patient reports right hip pain at this time, not increased from prior.   Examined patient with the resident on call as well.   Negative Log roll. Patient limited ROM in flexion due to pain. Leg lengths unchanged from prior examination.   Abduction pillow replaced.   AP pelvis xray ordered to confirm hip is located.   Will continue to monitor.     Zenaida Bah PA-C  Orthopedic Surgery  Team Pager: [phone]

## 2022-09-15 VITALS
TEMPERATURE: 98 F | SYSTOLIC BLOOD PRESSURE: 119 MMHG | DIASTOLIC BLOOD PRESSURE: 69 MMHG | RESPIRATION RATE: 18 BRPM | HEART RATE: 89 BPM | OXYGEN SATURATION: 96 %

## 2022-09-15 PROBLEM — E03.9 HYPOTHYROIDISM, UNSPECIFIED: Chronic | Status: ACTIVE | Noted: 2022-09-09

## 2022-09-15 PROBLEM — M48.00 SPINAL STENOSIS, SITE UNSPECIFIED: Chronic | Status: ACTIVE | Noted: 2022-09-09

## 2022-09-15 LAB
ANION GAP SERPL CALC-SCNC: 9 MMOL/L — SIGNIFICANT CHANGE UP (ref 5–17)
BUN SERPL-MCNC: 15 MG/DL — SIGNIFICANT CHANGE UP (ref 7–23)
CALCIUM SERPL-MCNC: 8.2 MG/DL — LOW (ref 8.4–10.5)
CHLORIDE SERPL-SCNC: 102 MMOL/L — SIGNIFICANT CHANGE UP (ref 96–108)
CO2 SERPL-SCNC: 24 MMOL/L — SIGNIFICANT CHANGE UP (ref 22–31)
CREAT SERPL-MCNC: 0.56 MG/DL — SIGNIFICANT CHANGE UP (ref 0.5–1.3)
EGFR: 93 ML/MIN/1.73M2 — SIGNIFICANT CHANGE UP
GLUCOSE SERPL-MCNC: 137 MG/DL — HIGH (ref 70–99)
HCT VFR BLD CALC: 29.4 % — LOW (ref 34.5–45)
HGB BLD-MCNC: 9.1 G/DL — LOW (ref 11.5–15.5)
MCHC RBC-ENTMCNC: 28.7 PG — SIGNIFICANT CHANGE UP (ref 27–34)
MCHC RBC-ENTMCNC: 31 GM/DL — LOW (ref 32–36)
MCV RBC AUTO: 92.7 FL — SIGNIFICANT CHANGE UP (ref 80–100)
NRBC # BLD: 0 /100 WBCS — SIGNIFICANT CHANGE UP (ref 0–0)
PLATELET # BLD AUTO: 259 K/UL — SIGNIFICANT CHANGE UP (ref 150–400)
POTASSIUM SERPL-MCNC: 4.3 MMOL/L — SIGNIFICANT CHANGE UP (ref 3.5–5.3)
POTASSIUM SERPL-SCNC: 4.3 MMOL/L — SIGNIFICANT CHANGE UP (ref 3.5–5.3)
RBC # BLD: 3.17 M/UL — LOW (ref 3.8–5.2)
RBC # FLD: 14.6 % — HIGH (ref 10.3–14.5)
SODIUM SERPL-SCNC: 135 MMOL/L — SIGNIFICANT CHANGE UP (ref 135–145)
WBC # BLD: 7.51 K/UL — SIGNIFICANT CHANGE UP (ref 3.8–10.5)
WBC # FLD AUTO: 7.51 K/UL — SIGNIFICANT CHANGE UP (ref 3.8–10.5)

## 2022-09-15 PROCEDURE — 97530 THERAPEUTIC ACTIVITIES: CPT

## 2022-09-15 PROCEDURE — 85027 COMPLETE CBC AUTOMATED: CPT

## 2022-09-15 PROCEDURE — 80048 BASIC METABOLIC PNL TOTAL CA: CPT

## 2022-09-15 PROCEDURE — 86901 BLOOD TYPING SEROLOGIC RH(D): CPT

## 2022-09-15 PROCEDURE — C1776: CPT

## 2022-09-15 PROCEDURE — 82330 ASSAY OF CALCIUM: CPT

## 2022-09-15 PROCEDURE — 71045 X-RAY EXAM CHEST 1 VIEW: CPT

## 2022-09-15 PROCEDURE — 73551 X-RAY EXAM OF FEMUR 1: CPT

## 2022-09-15 PROCEDURE — 73502 X-RAY EXAM HIP UNI 2-3 VIEWS: CPT

## 2022-09-15 PROCEDURE — 36430 TRANSFUSION BLD/BLD COMPNT: CPT

## 2022-09-15 PROCEDURE — 73501 X-RAY EXAM HIP UNI 1 VIEW: CPT

## 2022-09-15 PROCEDURE — 80053 COMPREHEN METABOLIC PANEL: CPT

## 2022-09-15 PROCEDURE — 97161 PT EVAL LOW COMPLEX 20 MIN: CPT

## 2022-09-15 PROCEDURE — 85730 THROMBOPLASTIN TIME PARTIAL: CPT

## 2022-09-15 PROCEDURE — C9399: CPT

## 2022-09-15 PROCEDURE — P9016: CPT

## 2022-09-15 PROCEDURE — 86850 RBC ANTIBODY SCREEN: CPT

## 2022-09-15 PROCEDURE — 97166 OT EVAL MOD COMPLEX 45 MIN: CPT

## 2022-09-15 PROCEDURE — 97110 THERAPEUTIC EXERCISES: CPT

## 2022-09-15 PROCEDURE — 84484 ASSAY OF TROPONIN QUANT: CPT

## 2022-09-15 PROCEDURE — 36415 COLL VENOUS BLD VENIPUNCTURE: CPT

## 2022-09-15 PROCEDURE — 84295 ASSAY OF SERUM SODIUM: CPT

## 2022-09-15 PROCEDURE — 72170 X-RAY EXAM OF PELVIS: CPT

## 2022-09-15 PROCEDURE — 85025 COMPLETE CBC W/AUTO DIFF WBC: CPT

## 2022-09-15 PROCEDURE — 86900 BLOOD TYPING SEROLOGIC ABO: CPT

## 2022-09-15 PROCEDURE — 97116 GAIT TRAINING THERAPY: CPT

## 2022-09-15 PROCEDURE — 85610 PROTHROMBIN TIME: CPT

## 2022-09-15 PROCEDURE — 85014 HEMATOCRIT: CPT

## 2022-09-15 PROCEDURE — 81001 URINALYSIS AUTO W/SCOPE: CPT

## 2022-09-15 PROCEDURE — 82803 BLOOD GASES ANY COMBINATION: CPT

## 2022-09-15 PROCEDURE — C1889: CPT

## 2022-09-15 PROCEDURE — 86923 COMPATIBILITY TEST ELECTRIC: CPT

## 2022-09-15 PROCEDURE — 82435 ASSAY OF BLOOD CHLORIDE: CPT

## 2022-09-15 PROCEDURE — U0005: CPT

## 2022-09-15 PROCEDURE — 84132 ASSAY OF SERUM POTASSIUM: CPT

## 2022-09-15 PROCEDURE — 83605 ASSAY OF LACTIC ACID: CPT

## 2022-09-15 PROCEDURE — 99285 EMERGENCY DEPT VISIT HI MDM: CPT

## 2022-09-15 PROCEDURE — 83880 ASSAY OF NATRIURETIC PEPTIDE: CPT

## 2022-09-15 PROCEDURE — U0003: CPT

## 2022-09-15 PROCEDURE — 85018 HEMOGLOBIN: CPT

## 2022-09-15 PROCEDURE — 82947 ASSAY GLUCOSE BLOOD QUANT: CPT

## 2022-09-15 RX ORDER — TRAMADOL HYDROCHLORIDE 50 MG/1
1 TABLET ORAL
Qty: 0 | Refills: 0 | DISCHARGE
Start: 2022-09-15

## 2022-09-15 RX ORDER — RIVAROXABAN 15 MG-20MG
1 KIT ORAL
Qty: 0 | Refills: 0 | DISCHARGE
Start: 2022-09-15

## 2022-09-15 RX ORDER — ACETAMINOPHEN 500 MG
3 TABLET ORAL
Qty: 0 | Refills: 0 | DISCHARGE
Start: 2022-09-15

## 2022-09-15 RX ORDER — OXYCODONE HYDROCHLORIDE 5 MG/1
1 TABLET ORAL
Qty: 0 | Refills: 0 | DISCHARGE
Start: 2022-09-15

## 2022-09-15 RX ADMIN — OXYCODONE HYDROCHLORIDE 2.5 MILLIGRAM(S): 5 TABLET ORAL at 12:08

## 2022-09-15 RX ADMIN — OXYCODONE HYDROCHLORIDE 2.5 MILLIGRAM(S): 5 TABLET ORAL at 06:01

## 2022-09-15 RX ADMIN — Medication 975 MILLIGRAM(S): at 05:45

## 2022-09-15 RX ADMIN — OXYCODONE HYDROCHLORIDE 2.5 MILLIGRAM(S): 5 TABLET ORAL at 13:08

## 2022-09-15 RX ADMIN — OXYCODONE HYDROCHLORIDE 2.5 MILLIGRAM(S): 5 TABLET ORAL at 06:30

## 2022-09-15 RX ADMIN — Medication 975 MILLIGRAM(S): at 14:31

## 2022-09-15 RX ADMIN — Medication 75 MICROGRAM(S): at 05:46

## 2022-09-15 RX ADMIN — Medication 975 MILLIGRAM(S): at 13:31

## 2022-09-15 RX ADMIN — Medication 975 MILLIGRAM(S): at 06:30

## 2022-09-15 RX ADMIN — Medication 1 TABLET(S): at 11:48

## 2022-09-15 RX ADMIN — RIVAROXABAN 10 MILLIGRAM(S): KIT at 11:48

## 2022-09-15 RX ADMIN — PANTOPRAZOLE SODIUM 40 MILLIGRAM(S): 20 TABLET, DELAYED RELEASE ORAL at 05:46

## 2022-09-15 NOTE — PROGRESS NOTE ADULT - SUBJECTIVE AND OBJECTIVE BOX
ORTHO PROGRESS NOTE    Overnight Events: Had an internal rotation event when OOB, XR negative for dislocation, abd pillow was replaced    SUBJECTIVE: Pt was seen and examined at bedside, NAD. Pt denies any nausea/vomiting, pain is well controlled.     OBJECTIVE:  Vital Signs Last 24 Hrs  T(C): 37 (15 Sep 2022 04:18), Max: 37.4 (14 Sep 2022 23:58)  T(F): 98.6 (15 Sep 2022 04:18), Max: 99.4 (14 Sep 2022 23:58)  HR: 70 (15 Sep 2022 04:18) (70 - 93)  BP: 100/61 (15 Sep 2022 04:18) (94/59 - 100/61)  BP(mean): --  RR: 18 (15 Sep 2022 04:18) (18 - 18)  SpO2: 98% (15 Sep 2022 04:18) (94% - 98%)    Parameters below as of 15 Sep 2022 04:18  Patient On (Oxygen Delivery Method): room air      09-13-22 @ 07:01  -  09-14-22 @ 07:00  --------------------------------------------------------  IN: 1260 mL / OUT: 1100 mL / NET: 160 mL    09-14-22 @ 07:01  -  09-15-22 @ 06:44  --------------------------------------------------------  IN: 480 mL / OUT: 200 mL / NET: 280 mL    Physical Examination:  Gen: NAD, laying comfortably in bed  Resp: Unlabored breathing  MSK: Dressing c/d/i          +EHL/FHL/TA/Gas/SOl          +DP/SP/Mario/Saph           2+DP  ABduction pillow in place    LABS:                        8.5    7.80  )-----------( 192      ( 14 Sep 2022 07:10 )             26.4       09-14    135  |  102  |  14  ----------------------------<  85  4.5   |  26  |  0.62    Ca    8.1<L>      14 Sep 2022 07:10

## 2022-09-15 NOTE — PROGRESS NOTE ADULT - PROBLEM SELECTOR PLAN 2
continue current dose of levothyroxine  Patient appears euthyroid

## 2022-09-15 NOTE — PROGRESS NOTE ADULT - PROBLEM SELECTOR PLAN 1
Patient tolerated the procedure well  to start working with physical therapy  PO as tolerated  Xaralto for AC  DC planning to rehab
Patient tolerated the procedure well  to start working with physical therapy  PO as tolerated  Xaralto for AC  eventual DC planning to rehab
Patient tolerated the procedure well  to start working with physical therapy  PO as tolerated  Sandraralrodrigo for AC
Patient tolerated the procedure well  to start working with physical therapy  PO as tolerated  Xaralto for AC  eventual DC planning to rehab

## 2022-09-15 NOTE — PROGRESS NOTE ADULT - PROBLEM SELECTOR PLAN 3
Pain meds as needed  follow for oversedation   GI regime to prevent constipation

## 2022-09-15 NOTE — PROGRESS NOTE ADULT - REASON FOR ADMISSION
R Periprosthetic Hip Fx

## 2022-09-15 NOTE — DISCHARGE NOTE NURSING/CASE MANAGEMENT/SOCIAL WORK - PATIENT PORTAL LINK FT
You can access the FollowMyHealth Patient Portal offered by [facility] by registering at the following website: [email or web address]. By joining [employer]’s FollowMyHealth portal, you will also be able to view your health information using other applications (apps) compatible with our system.

## 2022-09-15 NOTE — PROGRESS NOTE ADULT - PROBLEM SELECTOR PLAN 4
continue to monitor H&H  Transfuse as needed

## 2022-09-15 NOTE — PROGRESS NOTE ADULT - ASSESSMENT
Patient doing well post op no fevers or chills  Needs to do better with incentive spirometry   dvt and GI prophylaxis   Discharge to rehab    Time spent 45 min      Kedar Ferreira MD                    
 A/P: 81y Female S/p revision right total hip replacement, progressing well.    -PT/OT-NWB RLE With Posterior Hip Precautions/Abduction pillow.   -IS encouraged  - Tetracycline for corneal abrasion  -DVT PPx: Xarelto 10mg daily   -GI PPx   -Pain Control  -FU AM labs  -Dispo planning: [facility]    For all questions related to patient care, please reach out to the on-call team via the pager.     Erica Miller, PGY 1  Orthopaedic Surgery  [facility] [phone]  [facility] [id number]  [facility] [phone]    
A/P:  Pt is a 80 yo female with a right vanc b2 periprosthetic hip fx.   - Plan for surgical intervention this morning.   - Currently receiving 1U PRBC for h of < 8. 2U of PRBC on hold.   - Activity: bedrest  - chemical DVT ppx on hold for OR this morning  - Pain control/ Analgesia  - medical management - continue home medications  - appreciate medical clearance for OR.   - Incentive Spirometer  - notify Ortho for questions   - Discuss with Dr. Logan, will advise if above plan changes.     Zenaida Bah PA-C  Orthopedic Surgery  Team Pager #[phone]
80 yo woman s/p Open reduction and internal fixation of a right periprosthetic femur fracture
82 yo woman s/p Open reduction and internal fixation of a right periprosthetic femur fracture

## 2022-09-15 NOTE — PROGRESS NOTE ADULT - TIME BILLING
Discussed treatment plan with patient at bedside.
Discussed treatment plan with patient at bedside.
Discussed treatment plan with patient at bedside.  Patient scheduled for DC to rehab
Discussed treatment plan with patient at bedside.

## 2022-09-15 NOTE — PROGRESS NOTE ADULT - PROVIDER SPECIALTY LIST ADULT
Internal Medicine
Orthopedics
Internal Medicine
Orthopedics
Internal Medicine

## 2022-09-15 NOTE — PROVIDER CONTACT NOTE (OTHER) - ASSESSMENT
Pt stable, in no apparent distress, VSS. Pt only able to void 50 cc of urine on the bedpan, bladder scan showed 630 ml of urine. Pt stating 'I feel like I have to go more, but it's not coming out"

## 2022-09-15 NOTE — PROGRESS NOTE ADULT - PROBLEM SELECTOR PROBLEM 1
Periprosthetic hip fracture

## 2022-09-15 NOTE — DISCHARGE NOTE NURSING/CASE MANAGEMENT/SOCIAL WORK - NSDCPEFALRISK_GEN_ALL_CORE
For information on Fall & Injury Prevention, visit: [email or web address] OR  [email or web address] OR  https://www.cdc.gov/steadi/patient.html

## 2023-06-02 NOTE — PROGRESS NOTE ADULT - SUBJECTIVE AND OBJECTIVE BOX
Post-Procedure Ointment: post-procedure laser balm Patient is a 82 yo woman who was lost her ballance and fell backwards.  Patient was taken to the emergency department there and diagnosed with a periprosthetic right hip fracture.  Patient is complaining of severe pain.  She denies any other injury.  She denies any headache, dizziness, nausea or vomiting.  She denies any new neck or back pain.  She has chronic pain in the lower back from spinal stenosis.  She denies any chest/rib pain.  She denies any abdominal pain.  She denies any other extremity pain or injury.  She has no numbness or paresthesia.  EMS reports that patient was tachycardic on departure from [place] and was given morphine for pain.  Patient only reports history of hypothyroidism.  She denies any CAD, CVA, COPD, diabetes, hypertension, hyperlipidemia. Patient seen now s/p an Open reduction and internal fixation of a right periprosthetic femur fracture. Patient tolerated the procedure well. Patient states pain has been well controlled. Has started working with physical therapy. Patient is schedule for DC to rehab      MEDICATIONS  (STANDING):  acetaminophen     Tablet .. 975 milliGRAM(s) Oral every 8 hours  levothyroxine 75 MICROGram(s) Oral daily  multivitamin 1 Tablet(s) Oral daily  pantoprazole    Tablet 40 milliGRAM(s) Oral before breakfast  polyethylene glycol 3350 17 Gram(s) Oral at bedtime  rivaroxaban 10 milliGRAM(s) Oral daily    MEDICATIONS  (PRN):  magnesium hydroxide Suspension 30 milliLiter(s) Oral daily PRN Constipation  melatonin 3 milliGRAM(s) Oral at bedtime PRN Insomnia  oxyCODONE    IR 2.5 milliGRAM(s) Oral every 4 hours PRN Moderate Pain (4 - 6)  oxyCODONE    IR 5 milliGRAM(s) Oral every 4 hours PRN Severe Pain (7 - 10)  senna 2 Tablet(s) Oral at bedtime PRN Constipation  traMADol 50 milliGRAM(s) Oral every 6 hours PRN Mild Pain (1 - 3)          VITALS:   T(C): 36.7 (09-15-22 @ 13:46), Max: 37.4 (09-14-22 @ 23:58)  HR: 89 (09-15-22 @ 13:46) (70 - 93)  BP: 119/69 (09-15-22 @ 13:46) (94/59 - 119/69)  RR: 18 (09-15-22 @ 13:46) (18 - 18)  SpO2: 96% (09-15-22 @ 13:46) (94% - 98%)  Wt(kg): --    PHYSICAL EXAM:  GENERAL: NAD, well nourished and conversant  HEAD:  Atraumatic  EYES: EOM, PERRLA, conjunctiva pink and sclera white  ENT: No tonsillar erythema, exudates, or enlargement, moist mucous membranes, good dentition, no lesions  NECK: Supple, No JVD, normal thyroid, carotids with normal upstrokes and no bruits  CHEST/LUNG: Clear to auscultation bilaterally, No rales, rhonchi, wheezing, or rubs  HEART: Regular rate and rhythm, No murmurs, rubs, or gallops  ABDOMEN: Soft, nondistended, no masses, guarding, tenderness or rebound, bowel sounds present  EXTREMITIES:  2+ Peripheral Pulses, No clubbing, cyanosis, or edema.   LYMPH: No lymphadenopathy noted  SKIN: No rashes or lesions  NERVOUS SYSTEM:  Alert & Oriented X3, normal cognitive function. Motor Strength 5/5 right upper and right lower.  5/5 left upper and left lower extremities, DTRs 2+ intact and symmetric    LABS:        CBC Full  -  ( 15 Sep 2022 09:48 )  WBC Count : 7.51 K/uL  RBC Count : 3.17 M/uL  Hemoglobin : 9.1 g/dL  Hematocrit : 29.4 %  Platelet Count - Automated : 259 K/uL  Mean Cell Volume : 92.7 fl  Mean Cell Hemoglobin : 28.7 pg  Mean Cell Hemoglobin Concentration : 31.0 gm/dL  Auto Neutrophil # : x  Auto Lymphocyte # : x  Auto Monocyte # : x  Auto Eosinophil # : x  Auto Basophil # : x  Auto Neutrophil % : x  Auto Lymphocyte % : x  Auto Monocyte % : x  Auto Eosinophil % : x  Auto Basophil % : x    09-15    135  |  102  |  15  ----------------------------<  137<H>  4.3   |  24  |  0.56    Ca    8.2<L>      15 Sep 2022 09:48            CAPILLARY BLOOD GLUCOSE          RADIOLOGY & ADDITIONAL TESTS:

## 2024-10-07 ENCOUNTER — EMERGENCY (EMERGENCY)
Facility: HOSPITAL | Age: 84
LOS: 1 days | Discharge: HOME | End: 2024-10-07
Attending: EMERGENCY MEDICINE
Payer: MEDICARE

## 2024-10-07 VITALS
DIASTOLIC BLOOD PRESSURE: 80 MMHG | HEART RATE: 61 BPM | RESPIRATION RATE: 18 BRPM | TEMPERATURE: 98 F | SYSTOLIC BLOOD PRESSURE: 157 MMHG | OXYGEN SATURATION: 96 %

## 2024-10-07 VITALS
RESPIRATION RATE: 17 BRPM | SYSTOLIC BLOOD PRESSURE: 167 MMHG | DIASTOLIC BLOOD PRESSURE: 82 MMHG | HEART RATE: 106 BPM | HEIGHT: 61 IN | TEMPERATURE: 99 F | WEIGHT: 119.93 LBS | OXYGEN SATURATION: 99 %

## 2024-10-07 LAB
APPEARANCE UR: CLEAR — SIGNIFICANT CHANGE UP
BACTERIA # UR AUTO: ABNORMAL /HPF
BILIRUB UR-MCNC: NEGATIVE — SIGNIFICANT CHANGE UP
COLOR SPEC: YELLOW — SIGNIFICANT CHANGE UP
DIFF PNL FLD: NEGATIVE — SIGNIFICANT CHANGE UP
EPI CELLS # UR: PRESENT
GLUCOSE UR QL: NEGATIVE MG/DL — SIGNIFICANT CHANGE UP
KETONES UR-MCNC: NEGATIVE MG/DL — SIGNIFICANT CHANGE UP
LEUKOCYTE ESTERASE UR-ACNC: ABNORMAL
NITRITE UR-MCNC: NEGATIVE — SIGNIFICANT CHANGE UP
PH UR: 5.5 — SIGNIFICANT CHANGE UP (ref 5–8)
PROT UR-MCNC: NEGATIVE MG/DL — SIGNIFICANT CHANGE UP
RBC CASTS # UR COMP ASSIST: 0 /HPF — SIGNIFICANT CHANGE UP (ref 0–4)
SP GR SPEC: 1.01 — SIGNIFICANT CHANGE UP (ref 1–1.03)
UROBILINOGEN FLD QL: 0.2 MG/DL — SIGNIFICANT CHANGE UP (ref 0.2–1)
WBC UR QL: 10 /HPF — HIGH (ref 0–5)

## 2024-10-07 PROCEDURE — 87086 URINE CULTURE/COLONY COUNT: CPT

## 2024-10-07 PROCEDURE — 81001 URINALYSIS AUTO W/SCOPE: CPT

## 2024-10-07 PROCEDURE — 99284 EMERGENCY DEPT VISIT MOD MDM: CPT

## 2024-10-07 PROCEDURE — 99283 EMERGENCY DEPT VISIT LOW MDM: CPT

## 2024-10-07 NOTE — ED ADULT NURSE NOTE - OBJECTIVE STATEMENT
Pt AOx4, ambulatory, c/o BL legs redness and swelling since Thursday, denies pain, SOB, fever. Pt currently taking antibiotics for UTI. Denies any worsening symptoms.

## 2024-10-07 NOTE — ED PROVIDER NOTE - OBJECTIVE STATEMENT
83-year-old woman presenting for evaluation of redness of her left shin.  She reports it began after taking amoxicillin for reported UTI.  She otherwise reports feeling well.  Denies any difficulty breathing, throat closing, chest pains.  Reports normal appetite.  No similar episodes in the past.  Currently on day 4 of Augmentin

## 2024-10-07 NOTE — ED PROVIDER NOTE - PROGRESS NOTE DETAILS
UA suggestive of contaminated sample.  Patient denying active urinary symptoms at this time.  Will recommend discontinuing antibiotics given possible reaction and follow-up with PMD will give information for dermatology as well.

## 2024-10-07 NOTE — ED PROVIDER NOTE - PHYSICAL EXAMINATION
Exam:  General: Patient well appearing  HEENT: airway patent with moist mucous membranes  Cardiac: RRR S1/S2 with strong peripheral pulses  Respiratory: lungs clear without respiratory distress  GI: abdomen soft, non tender, non distended  Skin: blanching erythema of L shin  Psych: normal mood and affect

## 2024-10-07 NOTE — ED PROVIDER NOTE - PATIENT PORTAL LINK FT
You can access the FollowMyHealth Patient Portal offered by [facility] by registering at the following website: [email or web address]. By joining [employer]’s FollowMyHealth portal, you will also be able to view your health information using other applications (apps) compatible with our system.

## 2024-10-07 NOTE — ED PROVIDER NOTE - CLINICAL SUMMARY MEDICAL DECISION MAKING FREE TEXT BOX
Patient presenting with skin rash after starting new medication.  Could be related to the med.  No evidence of anaphylaxis at this time.  Discussed with patient will repeat urinalysis in the emergency department and if no evidence of ongoing UTI will recommend discontinuing antibiotics and follow-up with dermatology regarding rash but does not self resolved.

## 2024-10-07 NOTE — ED PROVIDER NOTE - NSFOLLOWUPCLINICS_GEN_ALL_ED_FT
[facility] Dermatology [facility]  Dermatology  [address] [place] [id number]  Phone: [phone]  Fax: [phone]    [facility]  Dermatology  [address], [address]  [place] [address]  Phone: [phone]  Fax: [phone]    [facility] Dermatolgy  Dermatology  [address], [address]  [place] [address]  Phone: [phone]  Fax:

## 2024-10-07 NOTE — ED PROVIDER NOTE - NSFOLLOWUPINSTRUCTIONS_ED_ALL_ED_FT
Thank you for choosing [facility] for your healthcare.    You were seen in the Emergency Department for a rash on your legs.  We suspect this was caused by your antibiotics.  It is okay to stop taking them at this time.  We are including the information for our dermatologist who you can follow-up with.  Please return to the emergency room for any further emergent or concerning medical issues.

## 2024-10-07 NOTE — ED ADULT TRIAGE NOTE - CHIEF COMPLAINT QUOTE
B/L leg redness started thursday after starting antibotic for UTI , was seen in [facility]x with different meds

## 2024-10-08 LAB
CULTURE RESULTS: SIGNIFICANT CHANGE UP
SPECIMEN SOURCE: SIGNIFICANT CHANGE UP

## 2024-10-15 ENCOUNTER — APPOINTMENT (OUTPATIENT)
Dept: DERMATOLOGY | Facility: CLINIC | Age: 84
End: 2024-10-15

## 2025-03-17 ENCOUNTER — EMERGENCY (EMERGENCY)
Facility: HOSPITAL | Age: 85
LOS: 1 days | Discharge: HOME | End: 2025-03-17
Attending: EMERGENCY MEDICINE
Payer: MEDICARE

## 2025-03-17 VITALS
HEART RATE: 119 BPM | OXYGEN SATURATION: 98 % | SYSTOLIC BLOOD PRESSURE: 189 MMHG | DIASTOLIC BLOOD PRESSURE: 78 MMHG | WEIGHT: 114.86 LBS | TEMPERATURE: 98 F | RESPIRATION RATE: 16 BRPM

## 2025-03-17 PROCEDURE — 99283 EMERGENCY DEPT VISIT LOW MDM: CPT

## 2025-03-17 RX ORDER — CEPHALEXIN 250 MG/1
1 CAPSULE ORAL
Qty: 6 | Refills: 0
Start: 2025-03-17 | End: 2025-03-19

## 2025-03-17 RX ORDER — CLOTRIMAZOLE 1 G/100G
1 CREAM TOPICAL
Qty: 1 | Refills: 2
Start: 2025-03-17 | End: 2025-06-14

## 2025-03-17 NOTE — ED PROVIDER NOTE - OBJECTIVE STATEMENT
84F with PMHx of spinal stenosis and hypothyroid presents to ED with left posteriomedial redness. Patient states 9 days ago had intense itching to the left foot, which then lead to redness that was travelling to the toes. She went to [facility] ED where she was prescribed keflex for 7 days. Patient states she took full course but still has some redness and itchiness to the left foot. She is requesting bloodwork to r/o cellulitis. Patient denies any open wounds, pain to foot. Patient is seen by a podiatrist, does not remember their name, for routine foot care. On last visit an antifungal cream was prescribed but never filled, next visit in 4 weeks.

## 2025-03-17 NOTE — ED PROVIDER NOTE - CARE PLAN
1 Assessment and plan of treatment:	Cellulitis, left foot with underlying tinea pedis   Principal Discharge DX:	Cellulitis  Assessment and plan of treatment:	Cellulitis, left foot with underlying tinea pedis

## 2025-03-17 NOTE — ED PROVIDER NOTE - NSFOLLOWUPINSTRUCTIONS_ED_ALL_ED_FT
Thank you for choosing [facility] for your healthcare.    You were seen in the Emergency Department for continued redness of your foot.  We suspect that you have a healing cellulitis on the antibiotics you are already on so we are extending it for 3 more days.  We are also prescribing you an antifungal medication to help as well.  We recommend you follow-up closely in the next few days with your podiatrist as they are available.  Please return to the hospital for any further emergent or concerning medical issues.

## 2025-03-17 NOTE — ED ADULT NURSE NOTE - OBJECTIVE STATEMENT
Patient c/o left foot redness after scratching it, noticed it became red and spreading. Denies itchiness, issues ambulating

## 2025-03-17 NOTE — ED PROVIDER NOTE - PATIENT PORTAL LINK FT
You can access the FollowMyHealth Patient Portal offered by [facility] by registering at the following website: [email or web address]. By joining [employer]’s FollowMyHealth portal, you will also be able to view your health information using other applications (apps) compatible with our system.

## 2025-03-17 NOTE — ED ADULT NURSE NOTE - CAS ELECT INFOMATION PROVIDED
Discharge and medication education provided to patient, patient verbalized understanding./DC instructions

## 2025-03-17 NOTE — ED PROVIDER NOTE - ATTENDING CONTRIBUTION TO CARE
Presenting complaining of some redness of her right heel.  Reports her whole foot was red and inflamed a few days ago and she was seen in urgent care and started on Keflex.  She took her last dose this morning and there is still some residual redness.  She otherwise reports is significantly improved from where it was.  She is denying other symptoms.  On exam she has a small area of nonspecific erythema over her heel and also appears to have some tenial areas of her foot as well.  Will extend Keflex by 3 more days, started on a topical antifungal and have her follow-up with her podiatrist.

## 2025-03-17 NOTE — ED PROVIDER NOTE - SKIN, MLM
dry annular scaling lesions to left foot. Redness to the posteromedial aspect of left foot, no warmth, open wounds or lesions

## 2025-03-17 NOTE — ED PROVIDER NOTE - CLINICAL SUMMARY MEDICAL DECISION MAKING FREE TEXT BOX
Patient was evaluated and chart reviewed. D/w patient etiology of symptoms. Recommended extending abx rx for an addition 3 days and start anti-fungal topical medication. Recommended follow up with podiatrist within one week.

## 2025-03-17 NOTE — ED ADULT NURSE NOTE - NSFALLUNIVINTERV_ED_ALL_ED
Bed/Stretcher in lowest position, wheels locked, appropriate side rails in place/Call bell, personal items and telephone in reach/Instruct patient to call for assistance before getting out of bed/chair/stretcher/Non-slip footwear applied when patient is off stretcher/[place] to call system/Physically safe environment - no spills, clutter or unnecessary equipment/Purposeful proactive rounding/Room/bathroom lighting operational, light cord in reach

## (undated) DEVICE — SUT POLYSORB 2-0 30" GS-21 UNDYED

## (undated) DEVICE — STAPLER SKIN VISI-STAT 35 WIDE

## (undated) DEVICE — GLV 8.5 PROTEXIS (WHITE)

## (undated) DEVICE — Device

## (undated) DEVICE — TUBING SUCTION 20FT

## (undated) DEVICE — TAPE SILK 3"

## (undated) DEVICE — SUCTION YANKAUER NO CONTROL VENT

## (undated) DEVICE — FOLEY TRAY 16FR 5CC LTX UMETER CLOSED

## (undated) DEVICE — VENODYNE/SCD SLEEVE CALF LARGE

## (undated) DEVICE — SOL IRR POUR NS 0.9% 500ML

## (undated) DEVICE — SOL IRR POUR H2O 1000ML

## (undated) DEVICE — GLV 7 PROTEXIS (WHITE)

## (undated) DEVICE — DRAIN JACKSON PRATT 3 SPRING RESERVOIR W 10FR PVC DRAIN

## (undated) DEVICE — DRAPE MAYO STAND 30"

## (undated) DEVICE — SAW BLADE MICROAIRE OSCILATING 25.4MM X 90MM X 1.27MM

## (undated) DEVICE — GLV 7.5 PROTEXIS (WHITE)

## (undated) DEVICE — MIDAS REX LEGEND BALL FLUTED ACORN LG BORE 6.0MM X 9CM

## (undated) DEVICE — DRAPE 3/4 SHEET W REINFORCEMENT 56X77"

## (undated) DEVICE — ELCTR BOVIE TIP BLADE VALLEYLAB 6.5"

## (undated) DEVICE — SOL IRR BAG NS 0.9% 3000ML

## (undated) DEVICE — GLV 8 PROTEXIS (WHITE)

## (undated) DEVICE — MARKING PEN W RULER

## (undated) DEVICE — STRYKER MIXEVAC 3 BONE CEMENT MIXER

## (undated) DEVICE — DRAPE IOBAN 23" X 23"

## (undated) DEVICE — DRSG AQUACEL 3.5 X 10"

## (undated) DEVICE — POSITIONER FOAM EGG CRATE ULNAR 2PCS (PINK)

## (undated) DEVICE — GOWN TRIMAX LG

## (undated) DEVICE — SYR LUER LOK 10CC

## (undated) DEVICE — NDL HYPO SAFE 22G X 1.5" (BLACK)

## (undated) DEVICE — DRAPE TOWEL BLUE 17" X 24"

## (undated) DEVICE — WARMING BLANKET UPPER ADULT

## (undated) DEVICE — SPECIMEN CONTAINER 100ML

## (undated) DEVICE — SYR LUER LOK 20CC

## (undated) DEVICE — MIDAS REX LEGEND METAL CUTTER DIAMOND CARBIDE 25.4MM X 0.8MM

## (undated) DEVICE — PACK TOTAL HIP (2 PACKS)

## (undated) DEVICE — HOOD FLYTE STRYKER HELMET SHIELD

## (undated) DEVICE — POSITIONER FOAM ABDUCTION PILLOW MED (PINK)

## (undated) DEVICE — GLV 6.5 PROTEXIS (WHITE)

## (undated) DEVICE — DRSG AQUACEL 3.5 X 14"

## (undated) DEVICE — PULLER PLUG ULTRADRIVE  DISP 6 MM

## (undated) DEVICE — SUT PDS II 1 54" TP-1

## (undated) DEVICE — LAP PAD 18 X 18"

## (undated) DEVICE — SUT POLYSORB 0 36" GS-25 UNDYED

## (undated) DEVICE — DRSG TAPE MICROFOAM 3"

## (undated) DEVICE — DRAPE INSTRUMENT POUCH 6.75" X 11"

## (undated) DEVICE — PRESSURIZER FEM CNL W/O HUB MED

## (undated) DEVICE — SAW BLADE MICROAIRE RECIPROCATING 12.2MMX80MMX1.78MM

## (undated) DEVICE — POSITIONER HEAD REST PRONE

## (undated) DEVICE — MEDICATION LABELS W MARKER

## (undated) DEVICE — SUT POLYSORB 1 36" GS-21 UNDYED

## (undated) DEVICE — PRESSURIZER CMT W/O HUB SM

## (undated) DEVICE — SUT ETHIBOND EXCEL 2 30" OS-4

## (undated) DEVICE — SUT TICRON 0 30" TIES

## (undated) DEVICE — DRAPE 1/2 SHEET 40X57"

## (undated) DEVICE — NDL HYPO SAFE 18G X 1.5" (PINK)

## (undated) DEVICE — TUBING BRAT 2 SUCTION ASSEMBLY TWIST LOCK

## (undated) DEVICE — TUBING ULTRA DRIVE 3 IRRIGATION

## (undated) DEVICE — SUT POLYSORB 1 36" GS-25